# Patient Record
Sex: MALE | Race: BLACK OR AFRICAN AMERICAN | NOT HISPANIC OR LATINO | URBAN - METROPOLITAN AREA
[De-identification: names, ages, dates, MRNs, and addresses within clinical notes are randomized per-mention and may not be internally consistent; named-entity substitution may affect disease eponyms.]

---

## 2018-04-16 ENCOUNTER — INPATIENT (INPATIENT)
Facility: HOSPITAL | Age: 83
LOS: 3 days | Discharge: NURSING, REHAB OR LONG-TERM CARE | End: 2018-04-20
Attending: INTERNAL MEDICINE | Admitting: INTERNAL MEDICINE
Payer: MEDICARE

## 2018-04-16 VITALS
OXYGEN SATURATION: 98 % | SYSTOLIC BLOOD PRESSURE: 140 MMHG | RESPIRATION RATE: 20 BRPM | HEART RATE: 82 BPM | HEIGHT: 67 IN | TEMPERATURE: 98 F | DIASTOLIC BLOOD PRESSURE: 92 MMHG | WEIGHT: 190.04 LBS

## 2018-04-16 PROCEDURE — 99285 EMERGENCY DEPT VISIT HI MDM: CPT

## 2018-04-16 NOTE — ED ADULT TRIAGE NOTE - CHIEF COMPLAINT QUOTE
BIBA.  per EMS,. pt was going to sit in his wheelchair and "heard something snap".  pt c/o R-hip pain

## 2018-04-16 NOTE — ED PROVIDER NOTE - OBJECTIVE STATEMENT
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  Pt states he was going to sit in wheelchair when he "heard a crack" in R hip.  Pt fell into wheelchair, no trauma, no head injury, no LOC.  Denies dizziness, CP, SOB, abd pain, syncope.    No fever/chills, No photophobia/eye pain/changes in vision, No ear pain/sore throat/dysphagia, No chest pain/palpitations, no SOB/cough/wheeze/stridor, No abdominal pain, no rash, no changes in neurological status/function.

## 2018-04-16 NOTE — ED PROVIDER NOTE - PHYSICAL EXAMINATION
Gen: Alert, c/o pain  Head: NC, AT, EOMI, normal lids/conjunctiva  ENT: normal hearing, patent oropharynx, MMM  Neck: supple, no tenderness/meningismus, FROM  Pulm: Bilateral clear BS, normal resp effort, no wheeze/stridor/retractions  CV: RRR, no M/R/G, +dist pulses  Abd: soft, NT/ND, +BS, no guarding/rebound tenderness  Mskel: +pain w movement of RLE, sensation intact  Skin: no rash  Neuro: no sensory/motor deficits

## 2018-04-17 DIAGNOSIS — C79.9 SECONDARY MALIGNANT NEOPLASM OF UNSPECIFIED SITE: ICD-10-CM

## 2018-04-17 DIAGNOSIS — S32.301A UNSPECIFIED FRACTURE OF RIGHT ILIUM, INITIAL ENCOUNTER FOR CLOSED FRACTURE: ICD-10-CM

## 2018-04-17 DIAGNOSIS — I10 ESSENTIAL (PRIMARY) HYPERTENSION: ICD-10-CM

## 2018-04-17 DIAGNOSIS — E83.52 HYPERCALCEMIA: ICD-10-CM

## 2018-04-17 DIAGNOSIS — N17.9 ACUTE KIDNEY FAILURE, UNSPECIFIED: ICD-10-CM

## 2018-04-17 LAB
ALBUMIN SERPL ELPH-MCNC: 3.3 G/DL — SIGNIFICANT CHANGE UP (ref 3.3–5)
ALP SERPL-CCNC: 72 U/L — SIGNIFICANT CHANGE UP (ref 40–120)
ALT FLD-CCNC: 15 U/L — SIGNIFICANT CHANGE UP (ref 12–78)
ANION GAP SERPL CALC-SCNC: 4 MMOL/L — LOW (ref 5–17)
ANION GAP SERPL CALC-SCNC: 9 MMOL/L — SIGNIFICANT CHANGE UP (ref 5–17)
APTT BLD: 33.1 SEC — SIGNIFICANT CHANGE UP (ref 27.5–37.4)
AST SERPL-CCNC: 21 U/L — SIGNIFICANT CHANGE UP (ref 15–37)
BASOPHILS # BLD AUTO: 0.04 K/UL — SIGNIFICANT CHANGE UP (ref 0–0.2)
BASOPHILS # BLD AUTO: 0.04 K/UL — SIGNIFICANT CHANGE UP (ref 0–0.2)
BASOPHILS NFR BLD AUTO: 0.7 % — SIGNIFICANT CHANGE UP (ref 0–2)
BASOPHILS NFR BLD AUTO: 0.8 % — SIGNIFICANT CHANGE UP (ref 0–2)
BILIRUB SERPL-MCNC: 0.3 MG/DL — SIGNIFICANT CHANGE UP (ref 0.2–1.2)
BUN SERPL-MCNC: 17 MG/DL — SIGNIFICANT CHANGE UP (ref 7–23)
BUN SERPL-MCNC: 17 MG/DL — SIGNIFICANT CHANGE UP (ref 7–23)
CALCIUM SERPL-MCNC: 10.3 MG/DL — HIGH (ref 8.5–10.1)
CALCIUM SERPL-MCNC: 9.7 MG/DL — SIGNIFICANT CHANGE UP (ref 8.5–10.1)
CHLORIDE SERPL-SCNC: 101 MMOL/L — SIGNIFICANT CHANGE UP (ref 96–108)
CHLORIDE SERPL-SCNC: 106 MMOL/L — SIGNIFICANT CHANGE UP (ref 96–108)
CO2 SERPL-SCNC: 26 MMOL/L — SIGNIFICANT CHANGE UP (ref 22–31)
CO2 SERPL-SCNC: 27 MMOL/L — SIGNIFICANT CHANGE UP (ref 22–31)
CREAT SERPL-MCNC: 1.33 MG/DL — HIGH (ref 0.5–1.3)
CREAT SERPL-MCNC: 1.61 MG/DL — HIGH (ref 0.5–1.3)
EOSINOPHIL # BLD AUTO: 0.06 K/UL — SIGNIFICANT CHANGE UP (ref 0–0.5)
EOSINOPHIL # BLD AUTO: 0.06 K/UL — SIGNIFICANT CHANGE UP (ref 0–0.5)
EOSINOPHIL NFR BLD AUTO: 1.1 % — SIGNIFICANT CHANGE UP (ref 0–6)
EOSINOPHIL NFR BLD AUTO: 1.2 % — SIGNIFICANT CHANGE UP (ref 0–6)
GLUCOSE SERPL-MCNC: 111 MG/DL — HIGH (ref 70–99)
GLUCOSE SERPL-MCNC: 95 MG/DL — SIGNIFICANT CHANGE UP (ref 70–99)
HCT VFR BLD CALC: 26.9 % — LOW (ref 39–50)
HCT VFR BLD CALC: 27.4 % — LOW (ref 39–50)
HGB BLD-MCNC: 8.7 G/DL — LOW (ref 13–17)
HGB BLD-MCNC: 9.1 G/DL — LOW (ref 13–17)
IMM GRANULOCYTES NFR BLD AUTO: 0.2 % — SIGNIFICANT CHANGE UP (ref 0–1.5)
IMM GRANULOCYTES NFR BLD AUTO: 0.5 % — SIGNIFICANT CHANGE UP (ref 0–1.5)
INR BLD: 1.18 RATIO — HIGH (ref 0.88–1.16)
LYMPHOCYTES # BLD AUTO: 0.57 K/UL — LOW (ref 1–3.3)
LYMPHOCYTES # BLD AUTO: 0.74 K/UL — LOW (ref 1–3.3)
LYMPHOCYTES # BLD AUTO: 11.6 % — LOW (ref 13–44)
LYMPHOCYTES # BLD AUTO: 13.3 % — SIGNIFICANT CHANGE UP (ref 13–44)
MCHC RBC-ENTMCNC: 27.9 PG — SIGNIFICANT CHANGE UP (ref 27–34)
MCHC RBC-ENTMCNC: 28 PG — SIGNIFICANT CHANGE UP (ref 27–34)
MCHC RBC-ENTMCNC: 32.3 GM/DL — SIGNIFICANT CHANGE UP (ref 32–36)
MCHC RBC-ENTMCNC: 33.2 GM/DL — SIGNIFICANT CHANGE UP (ref 32–36)
MCV RBC AUTO: 84 FL — SIGNIFICANT CHANGE UP (ref 80–100)
MCV RBC AUTO: 86.5 FL — SIGNIFICANT CHANGE UP (ref 80–100)
MONOCYTES # BLD AUTO: 0.65 K/UL — SIGNIFICANT CHANGE UP (ref 0–0.9)
MONOCYTES # BLD AUTO: 0.79 K/UL — SIGNIFICANT CHANGE UP (ref 0–0.9)
MONOCYTES NFR BLD AUTO: 13.2 % — SIGNIFICANT CHANGE UP (ref 2–14)
MONOCYTES NFR BLD AUTO: 14.2 % — HIGH (ref 2–14)
NEUTROPHILS # BLD AUTO: 3.58 K/UL — SIGNIFICANT CHANGE UP (ref 1.8–7.4)
NEUTROPHILS # BLD AUTO: 3.91 K/UL — SIGNIFICANT CHANGE UP (ref 1.8–7.4)
NEUTROPHILS NFR BLD AUTO: 70.2 % — SIGNIFICANT CHANGE UP (ref 43–77)
NEUTROPHILS NFR BLD AUTO: 73 % — SIGNIFICANT CHANGE UP (ref 43–77)
NRBC # BLD: 0 /100 WBCS — SIGNIFICANT CHANGE UP (ref 0–0)
NRBC # BLD: 0 /100 WBCS — SIGNIFICANT CHANGE UP (ref 0–0)
PLATELET # BLD AUTO: 212 K/UL — SIGNIFICANT CHANGE UP (ref 150–400)
PLATELET # BLD AUTO: 235 K/UL — SIGNIFICANT CHANGE UP (ref 150–400)
POTASSIUM SERPL-MCNC: 3.9 MMOL/L — SIGNIFICANT CHANGE UP (ref 3.5–5.3)
POTASSIUM SERPL-MCNC: 4.3 MMOL/L — SIGNIFICANT CHANGE UP (ref 3.5–5.3)
POTASSIUM SERPL-SCNC: 3.9 MMOL/L — SIGNIFICANT CHANGE UP (ref 3.5–5.3)
POTASSIUM SERPL-SCNC: 4.3 MMOL/L — SIGNIFICANT CHANGE UP (ref 3.5–5.3)
PROT SERPL-MCNC: 9.7 GM/DL — HIGH (ref 6–8.3)
PROTHROM AB SERPL-ACNC: 12.9 SEC — HIGH (ref 9.8–12.7)
RBC # BLD: 3.11 M/UL — LOW (ref 4.2–5.8)
RBC # BLD: 3.26 M/UL — LOW (ref 4.2–5.8)
RBC # FLD: 17.9 % — HIGH (ref 10.3–14.5)
RBC # FLD: 18.1 % — HIGH (ref 10.3–14.5)
SODIUM SERPL-SCNC: 136 MMOL/L — SIGNIFICANT CHANGE UP (ref 135–145)
SODIUM SERPL-SCNC: 137 MMOL/L — SIGNIFICANT CHANGE UP (ref 135–145)
WBC # BLD: 4.91 K/UL — SIGNIFICANT CHANGE UP (ref 3.8–10.5)
WBC # BLD: 5.57 K/UL — SIGNIFICANT CHANGE UP (ref 3.8–10.5)
WBC # FLD AUTO: 4.91 K/UL — SIGNIFICANT CHANGE UP (ref 3.8–10.5)
WBC # FLD AUTO: 5.57 K/UL — SIGNIFICANT CHANGE UP (ref 3.8–10.5)

## 2018-04-17 PROCEDURE — 72192 CT PELVIS W/O DYE: CPT | Mod: 26

## 2018-04-17 PROCEDURE — 73552 X-RAY EXAM OF FEMUR 2/>: CPT | Mod: 26,RT

## 2018-04-17 PROCEDURE — 12345: CPT | Mod: NC

## 2018-04-17 PROCEDURE — 99223 1ST HOSP IP/OBS HIGH 75: CPT

## 2018-04-17 PROCEDURE — 73502 X-RAY EXAM HIP UNI 2-3 VIEWS: CPT | Mod: 26,RT

## 2018-04-17 RX ORDER — MORPHINE SULFATE 50 MG/1
2 CAPSULE, EXTENDED RELEASE ORAL EVERY 6 HOURS
Qty: 0 | Refills: 0 | Status: DISCONTINUED | OUTPATIENT
Start: 2018-04-17 | End: 2018-04-20

## 2018-04-17 RX ORDER — SODIUM CHLORIDE 9 MG/ML
1500 INJECTION INTRAMUSCULAR; INTRAVENOUS; SUBCUTANEOUS ONCE
Qty: 0 | Refills: 0 | Status: COMPLETED | OUTPATIENT
Start: 2018-04-17 | End: 2018-04-17

## 2018-04-17 RX ORDER — MORPHINE SULFATE 50 MG/1
2 CAPSULE, EXTENDED RELEASE ORAL ONCE
Qty: 0 | Refills: 0 | Status: DISCONTINUED | OUTPATIENT
Start: 2018-04-17 | End: 2018-04-17

## 2018-04-17 RX ORDER — HEPARIN SODIUM 5000 [USP'U]/ML
5000 INJECTION INTRAVENOUS; SUBCUTANEOUS EVERY 12 HOURS
Qty: 0 | Refills: 0 | Status: DISCONTINUED | OUTPATIENT
Start: 2018-04-17 | End: 2018-04-20

## 2018-04-17 RX ADMIN — MORPHINE SULFATE 2 MILLIGRAM(S): 50 CAPSULE, EXTENDED RELEASE ORAL at 02:23

## 2018-04-17 RX ADMIN — MORPHINE SULFATE 2 MILLIGRAM(S): 50 CAPSULE, EXTENDED RELEASE ORAL at 07:12

## 2018-04-17 RX ADMIN — SODIUM CHLORIDE 375 MILLILITER(S): 9 INJECTION INTRAMUSCULAR; INTRAVENOUS; SUBCUTANEOUS at 07:54

## 2018-04-17 RX ADMIN — MORPHINE SULFATE 2 MILLIGRAM(S): 50 CAPSULE, EXTENDED RELEASE ORAL at 03:00

## 2018-04-17 RX ADMIN — MORPHINE SULFATE 2 MILLIGRAM(S): 50 CAPSULE, EXTENDED RELEASE ORAL at 06:22

## 2018-04-17 NOTE — ED CLERICAL - NS ED CLERK UNITS
Problem: Chemotherapy Treatment  Goal: *Chemotherapy regimen followed  Outcome: Progressing Towards Goal  PT arrived at [facility] ambulatory and in no distress  For Gemzar C3D1 RF 1D/162W

## 2018-04-17 NOTE — H&P ADULT - NSHPREVIEWOFSYSTEMS_GEN_ALL_CORE
REVIEW OF SYSTEMS:  CONSTITUTIONAL: No fever, weight loss, or fatigue  EYES: No eye pain, visual disturbances, or discharge  ENMT:  No difficulty hearing, tinnitus, vertigo; No sinus or throat pain  NECK: No pain or stiffness  RESPIRATORY: No cough, wheezing, chills or hemoptysis; No shortness of breath  CARDIOVASCULAR: No chest pain, palpitations, dizziness, or leg swelling  GASTROINTESTINAL: No abdominal or epigastric pain. No nausea, vomiting, or hematemesis; No diarrhea or constipation. No melena or hematochezia.  GENITOURINARY: No dysuria, frequency, hematuria, or incontinence  NEUROLOGICAL: No headaches, memory loss, loss of strength, numbness, or tremors  SKIN: No itching, burning, rashes, or lesions   LYMPH NODES: No enlarged glands  ENDOCRINE: No heat or cold intolerance; No hair loss  MUSCULOSKELETAL: No joint pain or swelling; No muscle, back pain, + hip pain  PSYCHIATRIC: No depression, anxiety, mood swings, or difficulty sleeping  HEME/LYMPH: No easy bruising, or bleeding gums  ALLERGY AND IMMUNOLOGIC: No hives or eczema

## 2018-04-17 NOTE — H&P ADULT - HISTORY OF PRESENT ILLNESS
89 y/o Male PMH of HTN, melanoma with mets to bone s/p chemo & radiation, complains of right pelvic pain for 1 day. Patient states that he was transferring to his wheelchair and felt a pop in his right pelvic region, with right pelvic pain since then and difficulty ambulating. Patient denies numbness, paresthesias, head strike, loss of consciousness, bowel or bladder changes, numbness, tingling, paresthesias, saddle anesthesia or any other signs/symptoms at this time. Patient states that he was previously a household ambulator with a walker at baseline, but has rapidly declined in ambulation status within the past month, requiring a wheelchair within the past week. Patient complains of diffuse pains, but denies back pain or left hip pain.

## 2018-04-17 NOTE — ED ADULT NURSE NOTE - OBJECTIVE STATEMENT
Patient complaining of right hip pain after a fall at home, pt was going to sit on his wheelchair , felt and heard a snap . pain level 10/10

## 2018-04-17 NOTE — PATIENT PROFILE ADULT. - TEACHING/LEARNING LEARNING PREFERENCES
audio/skill demonstration/individual instruction/computer/internet/written material/verbal instruction

## 2018-04-17 NOTE — CONSULT NOTE ADULT - SUBJECTIVE AND OBJECTIVE BOX
HPI  89M complains of right pelvic pain for 1 day. Patient states that he was transferring to his wheelchair and felt a pop in his right pelvic region, with right pelvic pain since then and difficulty ambulating. Patient denies numbness, paresthesias, headstrike, loss of consciousness, bowel or bladder changes, numbness, tingling, paresthesias, saddle anesthesia or any other signs/symptoms at this time. Patient states that he was previously a household ambulator with a walker at baseline, but has rapidly declined in ambulation status within the past month, requiring a wheelchair within the past week. Patient complains of diffuse pains, but denies back pain or left hip pain.    Allergies: NKDA  PMH/PSH: HTN, multiple myeloma s/p chemo/radiation for 8 months, right femur IMN for prophylactic fixation ([facility] January 2018)  Social: Denies tobacco use  FH: Noncontributory  Imaging: XR right femur / pelvis, CT pelvis - right iliac wing fracture, multiple lytic lesions throughout    ROS: Negative for all systems except as noted above in HPI    Physical exam  VS: Afebrile, vital signs stable  Gen: NAD  right LE: Skin intact. No erythema/ecchymosis/warmth. No TTP bony prominences at knee/ankle/foot.+EHL/FHL/TA/GS. SILT L3-S1, +DP pulse, capillary refill brisk. Compartments soft and nontender.  Secondary survey: No TTP bony prominences with intact AROM at baseline per patient, but patient reports diffuse "spasm" pains. SILT. Capillary refill brisk. Able to SLR with contralateral leg. No TTP axial spine.      89M with right pathologic iliac wing fracture, s/p chemo/radiation/right femur IMN for multiple myeloma    WBAT  Pain control  No planned orthopedic intervention at this time  Medical management for multiple myeloma  Ortho stable for discharge  Follow up in office within 5 days of discharge with primary surgeon  Will discuss with attending and advise if change

## 2018-04-17 NOTE — H&P ADULT - NSHPLABSRESULTS_GEN_ALL_CORE
LABS:                        9.1    5.57  )-----------( 235      ( 17 Apr 2018 03:00 )             27.4     04-17    136  |  101  |  17  ----------------------------<  111<H>  4.3   |  26  |  1.61<H>    Ca    10.3<H>      17 Apr 2018 03:00    TPro  9.7<H>  /  Alb  3.3  /  TBili  0.3  /  DBili  x   /  AST  21  /  ALT  15  /  AlkPhos  72  04-17    PT/INR - ( 17 Apr 2018 03:00 )   PT: 12.9 sec;   INR: 1.18 ratio         PTT - ( 17 Apr 2018 03:00 )  PTT:33.1 sec    CAPILLARY BLOOD GLUCOSE              RADIOLOGY & ADDITIONAL TESTS:    Imaging Personally Reviewed:  [ x] YES  [ ] NO  < from: CT Pelvis Bony Only No Cont (04.17.18 @ 03:23) >    Acute minimally displaced pathologic fracture of the right iliac wing   secondary to large destructive lytic lesion as described above.   Additional lytic lesion in the ischium and sacrum. Findings are likely   secondary to metastatic disease. Myeloma is also a consideration.    CXR: no infiltrate, uncoiled aorta      Consultant(s) Notes Reviewed:  [x ] YES  [ ] NO

## 2018-04-17 NOTE — H&P ADULT - ASSESSMENT
89 y/o man with melanoma metastatic to bone, presents with pathological fracture. Pt seen and evaluated by orthopedics, not surgical candidate.  IMPROVE VTE Individual Risk Assessment          RISK                                                          Points    [  ] Previous VTE                                                3    [  ] Thrombophilia                                             2    [  ] Lower limb paralysis                                    2        (unable to hold up >15 seconds)      [x  ] Current Cancer                                             2         (within 6 months)    [  ] Immobilization > 24 hrs                              1    [  ] ICU/CCU stay > 24 hours                            1    [ x ] Age > 60                                                    1    IMPROVE VTE Score ____3_____

## 2018-04-17 NOTE — CHART NOTE - NSCHARTNOTEFT_GEN_A_CORE
89 y/o Male PMH of HTN, melanoma with mets to bone s/p chemo & radiation, complains of right pelvic pain for 1 day      CT pelvis : Acute minimally displaced pathologic fracture of the right iliac wing   secondary to large destructive lytic lesion as described above.   Additional lytic lesion in the ischium and sacrum.    As per ortho team ,No planned orthopedic intervention at this time  PT/OT consult,  pain control , discussed w/ pt , and daughter Tiffanie over the phone as per pt's request

## 2018-04-17 NOTE — H&P ADULT - NSHPPHYSICALEXAM_GEN_ALL_CORE
T(C): 36.7 (17 Apr 2018 04:41), Max: 36.7 (17 Apr 2018 04:41)  T(F): 98 (17 Apr 2018 04:41), Max: 98 (17 Apr 2018 04:41)  HR: 80 (17 Apr 2018 04:41) (80 - 82)  BP: 138/90 (17 Apr 2018 04:41) (138/90 - 140/92)  BP(mean): --  RR: 20 (17 Apr 2018 04:41) (20 - 20)  SpO2: 98% (17 Apr 2018 04:41) (98% - 98%)    PHYSICAL EXAM:  GENERAL: NAD, well-groomed, well-developed  HEAD:  Atraumatic, Normocephalic  EYES: EOMI, PERRLA, conjunctiva and sclera clear  ENMT: No tonsillar erythema, exudates, or enlargement; Moist mucous membranes, Good dentition, No lesions  NECK: Supple, No JVD, Normal thyroid  NERVOUS SYSTEM:  Alert & Oriented X3, Good concentration; Motor Strength 5/5 B/L upper and lower extremities; DTRs 2+ intact and symmetric  CHEST/LUNG: Clear to percussion bilaterally; No rales, rhonchi, wheezing, or rubs  HEART: Regular rate and rhythm; No murmurs, rubs, or gallops  ABDOMEN: Soft, Nontender, Nondistended; Bowel sounds present  EXTREMITIES: + Peripheral Pulses, No clubbing, cyanosis, or edema, tender over R hip  LYMPH: No lymphadenopathy noted  SKIN: No rashes or lesions

## 2018-04-18 DIAGNOSIS — D63.8 ANEMIA IN OTHER CHRONIC DISEASES CLASSIFIED ELSEWHERE: ICD-10-CM

## 2018-04-18 LAB
ANION GAP SERPL CALC-SCNC: 7 MMOL/L — SIGNIFICANT CHANGE UP (ref 5–17)
BUN SERPL-MCNC: 14 MG/DL — SIGNIFICANT CHANGE UP (ref 7–23)
CALCIUM SERPL-MCNC: 9.8 MG/DL — SIGNIFICANT CHANGE UP (ref 8.5–10.1)
CHLORIDE SERPL-SCNC: 105 MMOL/L — SIGNIFICANT CHANGE UP (ref 96–108)
CO2 SERPL-SCNC: 24 MMOL/L — SIGNIFICANT CHANGE UP (ref 22–31)
CREAT SERPL-MCNC: 1.25 MG/DL — SIGNIFICANT CHANGE UP (ref 0.5–1.3)
GLUCOSE SERPL-MCNC: 92 MG/DL — SIGNIFICANT CHANGE UP (ref 70–99)
HCT VFR BLD CALC: 27.6 % — LOW (ref 39–50)
HGB BLD-MCNC: 8.8 G/DL — LOW (ref 13–17)
MCHC RBC-ENTMCNC: 27.2 PG — SIGNIFICANT CHANGE UP (ref 27–34)
MCHC RBC-ENTMCNC: 31.9 GM/DL — LOW (ref 32–36)
MCV RBC AUTO: 85.4 FL — SIGNIFICANT CHANGE UP (ref 80–100)
NRBC # BLD: 0 /100 WBCS — SIGNIFICANT CHANGE UP (ref 0–0)
PLATELET # BLD AUTO: 202 K/UL — SIGNIFICANT CHANGE UP (ref 150–400)
POTASSIUM SERPL-MCNC: 4.2 MMOL/L — SIGNIFICANT CHANGE UP (ref 3.5–5.3)
POTASSIUM SERPL-SCNC: 4.2 MMOL/L — SIGNIFICANT CHANGE UP (ref 3.5–5.3)
RBC # BLD: 3.23 M/UL — LOW (ref 4.2–5.8)
RBC # FLD: 18.2 % — HIGH (ref 10.3–14.5)
SODIUM SERPL-SCNC: 136 MMOL/L — SIGNIFICANT CHANGE UP (ref 135–145)
WBC # BLD: 5.09 K/UL — SIGNIFICANT CHANGE UP (ref 3.8–10.5)
WBC # FLD AUTO: 5.09 K/UL — SIGNIFICANT CHANGE UP (ref 3.8–10.5)

## 2018-04-18 PROCEDURE — 99232 SBSQ HOSP IP/OBS MODERATE 35: CPT

## 2018-04-18 PROCEDURE — 99497 ADVNCD CARE PLAN 30 MIN: CPT

## 2018-04-18 RX ORDER — OXYCODONE AND ACETAMINOPHEN 5; 325 MG/1; MG/1
1 TABLET ORAL EVERY 4 HOURS
Qty: 0 | Refills: 0 | Status: DISCONTINUED | OUTPATIENT
Start: 2018-04-18 | End: 2018-04-20

## 2018-04-18 RX ADMIN — MORPHINE SULFATE 2 MILLIGRAM(S): 50 CAPSULE, EXTENDED RELEASE ORAL at 11:38

## 2018-04-18 RX ADMIN — HEPARIN SODIUM 5000 UNIT(S): 5000 INJECTION INTRAVENOUS; SUBCUTANEOUS at 06:21

## 2018-04-18 RX ADMIN — HEPARIN SODIUM 5000 UNIT(S): 5000 INJECTION INTRAVENOUS; SUBCUTANEOUS at 18:54

## 2018-04-18 NOTE — PROGRESS NOTE ADULT - SUBJECTIVE AND OBJECTIVE BOX
CHIEF COMPLAINT/INTERVAL HISTORY:    Patient is a 89y old  Male who presents with a chief complaint of Hip pain. (17 Apr 2018 07:06)      HPI:  89 y/o Male PMH of HTN, melanoma with mets to bone s/p chemo & radiation, complains of right pelvic pain for 1 day. Patient states that he was transferring to his wheelchair and felt a pop in his right pelvic region, with right pelvic pain since then and difficulty ambulating. Patient denies numbness, paresthesias, head strike, loss of consciousness, bowel or bladder changes, numbness, tingling, paresthesias, saddle anesthesia or any other signs/symptoms at this time. Patient states that he was previously a household ambulator with a walker at baseline, but has rapidly declined in ambulation status within the past month, requiring a wheelchair within the past week. Patient complains of diffuse pains, but denies back pain or left hip pain. (17 Apr 2018 07:06)      SUBJECTIVE & OBJECTIVE: Pt seen and examined at bedside.   c/o pain in pelvic area.  denies urinary or fecal incontinence.  Denies chest pain/SOB, nausea/vomiting/diarrhea, No cough, dizziness, HA or blurry vision, all other ROS negative.  Pt follows with Oncologist at [facility] for MM, last follow up 1.5 month ago, was supposed to follow again 2 days ago but missed appt.      Vital Signs Last 24 Hrs  T(C): 36.8 (18 Apr 2018 11:05), Max: 36.8 (18 Apr 2018 11:05)  T(F): 98.3 (18 Apr 2018 11:05), Max: 98.3 (18 Apr 2018 11:05)  HR: 76 (18 Apr 2018 11:05) (76 - 83)  BP: 110/64 (18 Apr 2018 11:05) (105/67 - 126/68)  BP(mean): --  ABP: --  ABP(mean): --  RR: 17 (18 Apr 2018 11:05) (16 - 18)  SpO2: 97% (18 Apr 2018 11:05) (95% - 99%)      MEDICATIONS  (STANDING):  heparin  Injectable 5000 Unit(s) SubCutaneous every 12 hours    MEDICATIONS  (PRN):  morphine  - Injectable 2 milliGRAM(s) IV Push every 6 hours PRN Severe Pain (7 - 10)      PHYSICAL EXAM:    GENERAL: NAD, elderly  HEAD:  Atraumatic, Normocephalic  EYES: EOMI, PERRLA, conjunctiva and sclera clear  ENMT: Moist mucous membranes  NECK: Supple, No JVD  NERVOUS SYSTEM:  Alert & Oriented X3, Motor Strength 4/5 B/L upper and lower extremities;   CHEST/LUNG: Clear to auscultation bilaterally; No rales, rhonchi, wheezing, or rubs  HEART: Regular rate and rhythm;   ABDOMEN: Soft, Nontender, Nondistended; Bowel sounds present  EXTREMITIES:   No clubbing, cyanosis, or edema    LABS:                        8.8    5.09  )-----------( 202      ( 18 Apr 2018 06:29 )             27.6     04-18    136  |  105  |  14  ----------------------------<  92  4.2   |  24  |  1.25    Ca    9.8      18 Apr 2018 06:29    TPro  9.7<H>  /  Alb  3.3  /  TBili  0.3  /  DBili  x   /  AST  21  /  ALT  15  /  AlkPhos  72  04-17    PT/INR - ( 17 Apr 2018 03:00 )   PT: 12.9 sec;   INR: 1.18 ratio         PTT - ( 17 Apr 2018 03:00 )  PTT:33.1 sec    < from: CT Pelvis Bony Only No Cont (04.17.18 @ 03:23) >  IMPRESSION:    Acute minimally displaced pathologic fracture of the right iliac wing   secondary to large destructive lytic lesion as described above.   Additional lytic lesion in the ischium and sacrum. Findings are likely   secondary to metastatic disease. Myeloma is also a consideration.      < end of copied text >

## 2018-04-18 NOTE — GOALS OF CARE CONVERSATION - PERSONAL ADVANCE DIRECTIVE - CONVERSATION DETAILS
89 y/o Male PMH of HTN, melanoma with mets to bone s/p chemo & radiation, complains of right pelvic pain for 1 day. Patient states that he was transferring to his wheelchair and felt a pop in his right pelvic region, with right pelvic pain since then and difficulty ambulating. Patient denies numbness, paresthesias, head strike, loss of consciousness, bowel or bladder changes, numbness, tingling, paresthesias, saddle anesthesia or any other signs/symptoms at this time. Patient states that he was previously a household ambulator with a walker at baseline, but has rapidly declined in ambulation status within the past month, requiring a wheelchair within the past week. Patient complains of diffuse pains, but denies back pain or left hip pain.     DATE: 4/18/18.Met and  spoke to patient and discussed goals of care and advance care planning.                                          Educated on MOLST Form and completed, DNR/DNI, Comfort measures only.                                          Physical therapy evaluation done.  PLAN: Send patient to short term rehab when medically stable.

## 2018-04-18 NOTE — GOALS OF CARE CONVERSATION - PERSONAL ADVANCE DIRECTIVE - TREATMENT GUIDELINE COMMENT
DATE: 4/18/18.Met and  spoke to patient and discussed goals of care and advance care planning.                                          Educated on MOLST Form and completed, DNR/DNI, Comfort measures only.                                          Physical therapy evaluation done.  PLAN: Send patient to short term rehab when medically stable.

## 2018-04-18 NOTE — PROGRESS NOTE ADULT - SUBJECTIVE AND OBJECTIVE BOX
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 Patient states that he was transferring to his wheelchair and felt a pop in his right pelvic region, with right pelvic pain since then and difficulty ambulating. Patient denies numbness, paresthesias, head strike, loss of consciousness, bowel or bladder changes, numbness, tingling, paresthesias, saddle anesthesia or any other signs/symptoms at this time. Patient states that he was previously a household ambulator with a walker at baseline, but has rapidly declined in ambulation status within the past month, requiring a wheelchair within the past week. Patient complains of diffuse pains, but denies back pain or left hip pain. (17 Apr 2018 07:06)      PAST MEDICAL & SURGICAL HISTORY:  Essential hypertension  Melanoma  No significant past surgical history      SOCIAL HISTORY: Admitted from: Home [ ] SNF [ ] [facility] [ ] [facility] [ ]                                                                smoker [ ] past smoker [ ] non smoker[ ]                                                              no alcohol [ ] social alcohol [ ] alcohol [ ]  Surrogate/ HCP/ Guardian: [ ] YES [ ] NO.     NAME / PHONE #:    Significant other/partner:                     Children:                         Pentecostal/Spirituality:    FAMILY HISTORY:  No pertinent family history in first degree relatives      Baseline ADLs (Prior to admission)  Independent:  Moderately [ ] fully [ ]   Dependent: moderately [ ] fully [ ]    ADVANCE DIRECTIVES:  [ ] YES [ ] NO   DNR: YES [ ] NO [  ]  Completed on:                     MOLST: YES [ ] NO [  ]  Completed on:  Living Will: YES [ ] NO [  ]  Completed on:    Allergies    No Known Allergies    Intolerances      MEDICATIONS  (STANDING):  heparin  Injectable 5000 Unit(s) SubCutaneous every 12 hours    MEDICATIONS  (PRN):  morphine  - Injectable 2 milliGRAM(s) IV Push every 6 hours PRN Severe Pain (7 - 10)  oxyCODONE    5 mG/acetaminophen 325 mG 1 Tablet(s) Oral every 4 hours PRN Moderate Pain (4 - 6)    OPIATE NAIVE: (Y/N)  I STOP REVIEWED: (Y/N) : (#-------------------)     REVIEW OF SYSTEM:     PAIN : (Y/N) (0-10)  PAIN SITE :                           QUALITY/QUANTITY OF PAIN :             PAIN RADIATING :( Y/N)            SEVERITY :            FREQUENCY :  IMPACT ON ADLs :      DYSPNEA: Yes [  ] No [  ] Mild [ ] Moderate [ ] Severe [ ]  NAUSEA/VOMITING: Yes [  ] No [  ]  EXCESSIVE SECRETIONS: YES [  ] NO [  ]  DEPRESSION: Yes [  ] No [  ] Mild [ ]Moderate [ ] Severe [ ]  ANXIETY: Yes [  ] No [  ]  AGITATION: Yes [  ] No [  ]  CONSTIPATION : Yes [  ] No [  ]  DIARRHEA : Yes [  ] No [  ]  FRAILTY SYNDROME: Yes [  ] No [  ]  FAILURE TO THRIVE: Yes [  ] No [  ]  DIBILITY: Yes [  ] No [  ]  OTHER SYMPTOMS :  UNABLE TO OBTAIN DUE TO POOR MENTATION [  ]    PHYSICAL EXAM:  T(F): 98.7 (04-18-18 @ 17:25), Max: 98.7 (04-18-18 @ 17:25)  HR: 75 (04-18-18 @ 17:25) (75 - 82)  BP: 114/73 (04-18-18 @ 17:25) (105/67 - 126/68)  RR: 17 (04-18-18 @ 17:25) (17 - 18)  SpO2: 95% (04-18-18 @ 17:25) (95% - 98%)  Wt(kg): --   WEIGHT :                      BMI:  I&O's Summary    CAPILLARY BLOOD GLUCOSE          GENERAL: alert: Yes [ ] No [  ]oriented x ______confused [ ] lethargic [ ] agitated [ ] cachexia [ ] nonverbal [ ] coma [ ]  HEENT: normal [ ] dry mouth [ ] excessive secretions [ ] BiPAP [  ] ET tube/trach [ ] Vent [  ] O2 via N/C [ ]  LUNGS: Comfortable [ ] tachypnea/ labored breathing[ ]   CV :  normal [ ] tachycardia [ ]bradycardia [  ]   GI : normal [ ] distended [ ] tender [ ] no BS [ ]  PEG /NG tube (feeding/ Suction) [ ]   : normal [ ] incontinent [ ] oliguria/anuria [ ] Patel [ ]  MSK : normal [ ] weakness [ ] edema [ ] ambulatory [ ] bedbound/ wheelchair bound [ ]   SKIN : normal [ ] pressure ulcer: Yes [  ] No [  ] Stage ______________ rash: Yes [  ] No [  ]    FUNCTIONAL ASSESSMENT:   Karnofsky Performance Score :  Palliative Performance Status Version 2:         %    LABS:                        8.8    5.09  )-----------( 202      ( 18 Apr 2018 06:29 )             27.6     04-18    136  |  105  |  14  ----------------------------<  92  4.2   |  24  |  1.25    Ca    9.8      18 Apr 2018 06:29    TPro  9.7<H>  /  Alb  3.3  /  TBili  0.3  /  DBili  x   /  AST  21  /  ALT  15  /  AlkPhos  72  04-17    PT/INR - ( 17 Apr 2018 03:00 )   PT: 12.9 sec;   INR: 1.18 ratio         PTT - ( 17 Apr 2018 03:00 )  PTT:33.1 sec      I&O's Detail      ASSESSMENT:   89y Male admitted with FRACTURE OF ILIAC WING PATHOLOGICAL FRACTURE OF ILIUM  WEAKNESS      PROBLEM LIST :  PROBLEM/RECOMMENDATION:   1) PROBLEM  : Goals of care, counseling/ discussion.  RECOMMENDATION : Meet with patient/ family and discussed GOC & Advanced care planning.                                            Palliative care information /counseling provided.                                             Advanced Directives addressed     PROBLEM/ RECOMMENDATION:  2)PROBLEM :CODE STATUS (Resuscitation/ DNR/ DNI),   RECOMMENDATION: DNR/ DNI    PROBLEM/ RECOMMENDATION :  3)PROBLEM : Medical order for life sustaining treatment  RECOMMENDATION : MOLST Form ( initiated/ completed) on :    PROBLEM/RECOMMENDATION :  4)PROBLEM: Advanced care planning  RECOMMENDATION : Family meeting on: DATE:                                          Met and  spoke to patient and discussed goals of care and advance care planning.                                          Educated on MOLST Form and completed, DNR/DNI, Comfort measures only.                                          Physical therapy evaluation done.                                         PLAN: Send patient to short term rehab when medically stable.    RECOMMENDATIONS:      CONSIDER DNR/ DNI, COMFORT CARE.                                          PAIN MANAGEMENT.                                          SYMPTOM MANAGEMENT WITH HOSPICE CARE.                                          HOSPICE CARE.        REFFERALS MADE FOR :                                                PALLIATIVE /MEDICAL SOCIAL WORKER AND : YES [  ] NO [  ]                                            HOSPICE: YES [  ] NO [  ]                                            : YES [  ] NO [  ]                                            NUTRITION: YES [  ] NO [  ]                                            SPEECH/ SWALLOW: YES [  ] NO [  ]                                            PSYHCH CONSULT: YES [ ] NO [ ]                                             PAIN MANAGEMENT: YES [  ] NO [  ]                                            PT/OT: YES [  ] NO [  ]                                            ETHICS: YES [  ] NO [  ] PALLIATIVE CARE CONSULTATION NOTE            Requested by Name: Dr. Gray  Date/ Time: 4/181/8  Reason for referral/ Consultation: Goals of care conversation.  HPI:  89 y/o Male PMH of HTN, melanoma with mets to bone s/p chemo & radiation, complains of right pelvic pain for 1 day. Patient states that he was transferring to his wheelchair and felt a pop in his right pelvic region, with right pelvic pain since then and difficulty ambulating. Patient denies numbness, paresthesias, head strike, loss of consciousness, bowel or bladder changes, numbness, tingling, paresthesias, saddle anesthesia or any other signs/symptoms at this time. Patient states that he was previously a household ambulator with a walker at baseline, but has rapidly declined in ambulation status within the past month, requiring a wheelchair within the past week. Patient complains of diffuse pains, but denies back pain or left hip pain.   PAST MEDICAL & SURGICAL HISTORY:  Essential hypertension  Melanoma  No significant past surgical history  SOCIAL HISTORY: Admitted from: Home [x ] SNF [ ] [facility] [ ] AL [ ]                                                                smoker [ ] past smoker [ ] non smoker[ ]                                                              no alcohol [ ] social alcohol [ ] alcohol [ ]  Surrogate/ HCP/ Guardian: [ ] YES [ ] NO.     NAME / PHONE #: Tiffanie Hendrickson ( daughter) [phone]    Significant other/partner:                     Children:                         Catholic/Spirituality:    FAMILY HISTORY:  No pertinent family history in first degree relatives    Baseline ADLs (Prior to admission)  Independent:  Moderately [x ] fully [ ]   Dependent: moderately [ ] fully [ ]    ADVANCE DIRECTIVES:  [x ] YES [ ] NO   DNR: YES [x ] NO [  ]  Completed on:                     MOLST: YES [x ] NO [  ]  Completed on: 4/18/18  Living Will: YES [ ] NO [  ]  Completed on:    Allergies  No Known Allergies  Intolerances    MEDICATIONS  (STANDING):  heparin  Injectable 5000 Unit(s) SubCutaneous every 12 hours    MEDICATIONS  (PRN):  morphine  - Injectable 2 milliGRAM(s) IV Push every 6 hours PRN Severe Pain (7 - 10)  oxyCODONE    5 mG/acetaminophen 325 mG 1 Tablet(s) Oral every 4 hours PRN Moderate Pain (4 - 6)    OPIATE NAIVE: (Y/N)  I STOP REVIEWED: (Y/N) : (#-------------------)     REVIEW OF SYSTEM:     PAIN : (Y/N) (0-10)  PAIN SITE :    right hip   QUALITY/QUANTITY OF PAIN :  sharp           PAIN RADIATING : Y            SEVERITY :  8/10   FREQUENCY : movement  IMPACT ON ADLs : total care     DYSPNEA: Yes [ x ] No [  ] Mild [ ] Moderate [x ] Severe [ ]  NAUSEA/VOMITING: Yes [  ] No [ x ]  EXCESSIVE SECRETIONS: YES [  ] NO [ x ]  DEPRESSION: Yes [x  ] No [  ] Mild [ ]Moderate [x ] Severe [ ]  ANXIETY: Yes [ x ] No [  ]  AGITATION: Yes [  ] No [ x ]  CONSTIPATION : Yes [  ] No [  x]  DIARRHEA : Yes [  ] No [ x ]  FRAILTY SYNDROME: Yes [x  ] No [  ]  FAILURE TO THRIVE: Yes [ x ] No [  ]  DEBILITY Yes [x  ] No [  ]  OTHER SYMPTOMS :  UNABLE TO OBTAIN DUE TO POOR MENTATION [  ]    PHYSICAL EXAM:  T(F): 98.7 (04-18-18 @ 17:25), Max: 98.7 (04-18-18 @ 17:25)  HR: 75 (04-18-18 @ 17:25) (75 - 82)  BP: 114/73 (04-18-18 @ 17:25) (105/67 - 126/68)  RR: 17 (04-18-18 @ 17:25) (17 - 18)  SpO2: 95% (04-18-18 @ 17:25) (95% - 98%)  Wt(kg): --   WEIGHT :                      BMI:  I&O's Summary    CAPILLARY BLOOD GLUCOSE  GENERAL: alert: Yes [x ] No [  ]oriented x _3_____confused [ ] lethargic [ ] agitated [ ] cachexia [ ] nonverbal [ ] coma [ ]  HEENT: normal [x ] dry mouth [ ] excessive secretions [ ] BiPAP [  ] ET tube/trach [ ] Vent [  ] O2 via N/C [ ]  LUNGS: Comfortable [x ] tachypnea/ labored breathing[ ]   CV :  normal [ ] tachycardia [x ]bradycardia [  ]   GI : normal [x ] distended [ ] tender [ ] no BS [ ]  PEG /NG tube (feeding/ Suction) [ ]   : normal [ ] incontinent [x ] oliguria/anuria [ ] Patel [ ]  MSK : normal [ ] weakness [x ] edema [ ] ambulatory [ ] bedbound/ wheelchair bound [x ]   SKIN : normal [x ] pressure ulcer: Yes [  ] No [  ] Stage ______________ rash: Yes [  ] No [  ]    FUNCTIONAL ASSESSMENT:   Karnofsky Performance Score : <30  Palliative Performance Status Version 2:         %    LABS:                        8.8    5.09  )-----------( 202      ( 18 Apr 2018 06:29 )             27.6     04-18    136  |  105  |  14  ----------------------------<  92  4.2   |  24  |  1.25    Ca    9.8      18 Apr 2018 06:29    TPro  9.7<H>  /  Alb  3.3  /  TBili  0.3  /  DBili  x   /  AST  21  /  ALT  15  /  AlkPhos  72  04-17    PT/INR - ( 17 Apr 2018 03:00 )   PT: 12.9 sec;   INR: 1.18 ratio       PTT - ( 17 Apr 2018 03:00 )  PTT:33.1 sec    I&O's Detail  ASSESSMENT:   89y Male admitted with FRACTURE OF ILIAC WING PATHOLOGICAL FRACTURE OF ILIUM  WEAKNESS    PROBLEM LIST :  PROBLEM/RECOMMENDATION:   1) PROBLEM  : Goals of care, counseling/ discussion.  RECOMMENDATION : Meet with patient/ family and discussed GOC & Advanced care planning.                                            Palliative care information /counseling provided.                                             Advanced Directives addressed     PROBLEM/ RECOMMENDATION:  2)PROBLEM :CODE STATUS (Resuscitation/ DNR/ DNI),   RECOMMENDATION: DNR/ DNI    PROBLEM/ RECOMMENDATION :  3)PROBLEM : Medical order for life sustaining treatment  RECOMMENDATION : MOLST Form ( initiated/ completed) on :    PROBLEM/RECOMMENDATION :  4)PROBLEM: Advanced care planning  RECOMMENDATION : Family meeting on: DATE: 4/18/18.Met and  spoke to patient and discussed goals of care and advance care planning.                                          Educated on MOLST Form and completed, DNR/DNI, Comfort measures only.                                          Physical therapy evaluation done.  PLAN: Send patient to short term rehab when medically stable.    RECOMMENDATIONS:      CONSIDER DNR/ DNI, COMFORT CARE.                                          PAIN MANAGEMENT.                                          SYMPTOM MANAGEMENT WITH HOSPICE CARE.                                          HOSPICE CARE.    REFERRALS MADE FOR :       PALLIATIVE /MEDICAL SOCIAL WORKER AND : YES [x  ] NO [  ]                                            HOSPICE: YES [  ] NO [ x ]                                            : YES [  ] NO [ x ]                                            NUTRITION: YES [  ] NO [ x ]                                            SPEECH/ SWALLOW: YES [  ] NO [ x ]                                            PSYHCH CONSULT: YES [ ] NO [x ]                                             PAIN MANAGEMENT: YES [x  ] NO [  ]                                            PT/OT: YES [ x ] NO [  ]                                            ETHICS: YES [  ] NO [  ]

## 2018-04-18 NOTE — PHYSICAL THERAPY INITIAL EVALUATION ADULT - DIAGNOSIS, PT EVAL
Tomas Grye MRN# [id number]   YOB: 1946 Age: 71 year old     Date of Admission:  ***  Date of Discharge:  1/13/2018  Admitting Physician:  Elidia Chase MD  Discharging Physician: Elidia Mckeon,* (Contact: [phone])  Discharging Service:  Hematology / Oncology  Hospitalization Status: Inpatient     Primary Care Clinic:  ***  Primary Care Provider: Ekaterina Lozano     {   Salutation            :[id number]}            You have been identified as the Primary Care Provider for Tomas Grey, who was recently admitted to the [facility].  Thank you for the referral to our hospital.  It is our goal to provide the highest quality of care for our patients, including planning for seamless continuity of care by providing you with timely, accurate and concise information.  After reviewing the following combined discharge summary and final progress note, please contact us if you have any remaining questions.  The Discharging Physician will be the best informed, with their contact information listed above.  If unable to reach them, or if you have received this letter in error, please call [phone] and someone will try to help you.     
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]  Type 2 diabetes mellitus with stage 3 chronic kidney disease, unspecified long term insulin use status (H) [E11.22, N18.3]  Admit Date/Time: 1/8/2018  9:38 AM  Discharge Date: 1.13.18  Payor Source: [facility] / Product Type: HMO /     Readmission Assessment Measure (SERVANDO) Risk Score/category: elevated    Plan of Care Goals/Milestone Events:   Patient Goals:   Short-term - He would like to move to another SNF         Reason for Communication Hand-off Referral: Other required    Discharge Plan:  Return to [facility]     Concern for non-adherence with plan of care:   Y/N n  Discharge Needs Assessment:  He will need assistance in finding another SNF if he continues to wish to pursue this.     Already enrolled in Tele-monitoring program and name of program:  unknown  Follow-up specialty is recommended: see d/c orders     Follow-up plan:  Future Appointments  Date Time Provider Department Center   1/15/2018 7:00 PM Mariah Person, OT [facility]       Any outstanding tests or procedures:              Key Recommendations:      Anne Garcia    AVS/Discharge Summary is the source of truth; this is a helpful guide for improved communication of patient story          
R26.2 Difficulty in walking

## 2018-04-18 NOTE — PHYSICAL THERAPY INITIAL EVALUATION ADULT - PLANNED THERAPY INTERVENTIONS, PT EVAL
ROM/transfer training/gait training/postural re-education/balance training/strengthening/bed mobility training

## 2018-04-18 NOTE — PROGRESS NOTE ADULT - ASSESSMENT
89 y/o man with h/o melanoma metastatic to bone, presents with right pelvic bone pathological fracture. Pt seen and evaluated by orthopedics, not surgical candidate.

## 2018-04-19 DIAGNOSIS — C90.00 MULTIPLE MYELOMA NOT HAVING ACHIEVED REMISSION: ICD-10-CM

## 2018-04-19 DIAGNOSIS — M84.454A PATHOLOGICAL FRACTURE, PELVIS, INITIAL ENCOUNTER FOR FRACTURE: ICD-10-CM

## 2018-04-19 LAB
ANION GAP SERPL CALC-SCNC: 4 MMOL/L — LOW (ref 5–17)
BUN SERPL-MCNC: 14 MG/DL — SIGNIFICANT CHANGE UP (ref 7–23)
CALCIUM SERPL-MCNC: 9.7 MG/DL — SIGNIFICANT CHANGE UP (ref 8.5–10.1)
CHLORIDE SERPL-SCNC: 103 MMOL/L — SIGNIFICANT CHANGE UP (ref 96–108)
CO2 SERPL-SCNC: 29 MMOL/L — SIGNIFICANT CHANGE UP (ref 22–31)
CREAT SERPL-MCNC: 1.26 MG/DL — SIGNIFICANT CHANGE UP (ref 0.5–1.3)
FERRITIN SERPL-MCNC: 206 NG/ML — SIGNIFICANT CHANGE UP (ref 30–400)
GLUCOSE SERPL-MCNC: 93 MG/DL — SIGNIFICANT CHANGE UP (ref 70–99)
HCT VFR BLD CALC: 25.4 % — LOW (ref 39–50)
HGB BLD-MCNC: 8.1 G/DL — LOW (ref 13–17)
IRON SATN MFR SERPL: 37 UG/DL — LOW (ref 45–165)
KAPPA LC SER QL IFE: 11.7 MG/DL — HIGH (ref 0.33–1.94)
KAPPA/LAMBDA FREE LIGHT CHAIN RATIO, SERUM: 6.32 RATIO — HIGH (ref 0.26–1.65)
LAMBDA LC SER QL IFE: 1.85 MG/DL — SIGNIFICANT CHANGE UP (ref 0.57–2.63)
MCHC RBC-ENTMCNC: 27.4 PG — SIGNIFICANT CHANGE UP (ref 27–34)
MCHC RBC-ENTMCNC: 31.9 GM/DL — LOW (ref 32–36)
MCV RBC AUTO: 85.8 FL — SIGNIFICANT CHANGE UP (ref 80–100)
NRBC # BLD: 0 /100 WBCS — SIGNIFICANT CHANGE UP (ref 0–0)
PLATELET # BLD AUTO: 194 K/UL — SIGNIFICANT CHANGE UP (ref 150–400)
POTASSIUM SERPL-MCNC: 4.1 MMOL/L — SIGNIFICANT CHANGE UP (ref 3.5–5.3)
POTASSIUM SERPL-SCNC: 4.1 MMOL/L — SIGNIFICANT CHANGE UP (ref 3.5–5.3)
RBC # BLD: 2.96 M/UL — LOW (ref 4.2–5.8)
RBC # FLD: 18.1 % — HIGH (ref 10.3–14.5)
SODIUM SERPL-SCNC: 136 MMOL/L — SIGNIFICANT CHANGE UP (ref 135–145)
WBC # BLD: 3.58 K/UL — LOW (ref 3.8–10.5)
WBC # FLD AUTO: 3.58 K/UL — LOW (ref 3.8–10.5)

## 2018-04-19 PROCEDURE — 99221 1ST HOSP IP/OBS SF/LOW 40: CPT

## 2018-04-19 PROCEDURE — 99232 SBSQ HOSP IP/OBS MODERATE 35: CPT

## 2018-04-19 RX ADMIN — HEPARIN SODIUM 5000 UNIT(S): 5000 INJECTION INTRAVENOUS; SUBCUTANEOUS at 18:02

## 2018-04-19 RX ADMIN — HEPARIN SODIUM 5000 UNIT(S): 5000 INJECTION INTRAVENOUS; SUBCUTANEOUS at 06:12

## 2018-04-19 NOTE — CONSULT NOTE ADULT - PROBLEM SELECTOR RECOMMENDATION 9
Multiple Myeloma.  Myeloma Work Up.  Bone Marrow Bx.  Rehab.
-Pt to f/u with private oncologist on above date

## 2018-04-19 NOTE — PROGRESS NOTE ADULT - SUBJECTIVE AND OBJECTIVE BOX
CHIEF COMPLAINT/INTERVAL HISTORY:    Patient is a 89y old  Male who presents with a chief complaint of Hip pain. (17 Apr 2018 07:06)      HPI:  89 y/o Male PMH of HTN, melanoma with mets to bone s/p chemo & radiation, complains of right pelvic pain for 1 day. Patient states that he was transferring to his wheelchair and felt a pop in his right pelvic region, with right pelvic pain since then and difficulty ambulating. Patient denies numbness, paresthesias, head strike, loss of consciousness, bowel or bladder changes, numbness, tingling, paresthesias, saddle anesthesia or any other signs/symptoms at this time. Patient states that he was previously a household ambulator with a walker at baseline, but has rapidly declined in ambulation status within the past month, requiring a wheelchair within the past week. Patient complains of diffuse pains, but denies back pain or left hip pain. (17 Apr 2018 07:06)      SUBJECTIVE & OBJECTIVE: Pt seen and examined at bedside.     ICU Vital Signs Last 24 Hrs  T(C): 36.7 (19 Apr 2018 05:25), Max: 37.1 (18 Apr 2018 17:25)  T(F): 98.1 (19 Apr 2018 05:25), Max: 98.7 (18 Apr 2018 17:25)  HR: 73 (19 Apr 2018 05:25) (73 - 76)  BP: 110/64 (19 Apr 2018 05:25) (110/64 - 114/73)  BP(mean): --  ABP: --  ABP(mean): --  RR: 16 (19 Apr 2018 05:25) (16 - 17)  SpO2: 98% (19 Apr 2018 05:25) (95% - 98%)        MEDICATIONS  (STANDING):  heparin  Injectable 5000 Unit(s) SubCutaneous every 12 hours    MEDICATIONS  (PRN):  morphine  - Injectable 2 milliGRAM(s) IV Push every 6 hours PRN Severe Pain (7 - 10)  oxyCODONE    5 mG/acetaminophen 325 mG 1 Tablet(s) Oral every 4 hours PRN Moderate Pain (4 - 6)        PHYSICAL EXAM:    GENERAL: NAD, well-groomed, well-developed  HEAD:  Atraumatic, Normocephalic  EYES: EOMI, PERRLA, conjunctiva and sclera clear  ENMT: Moist mucous membranes  NECK: Supple, No JVD  NERVOUS SYSTEM:  Alert & Oriented X3, Motor Strength 5/5 B/L upper and lower extremities; DTRs 2+ intact and symmetric  CHEST/LUNG: Clear to auscultation bilaterally; No rales, rhonchi, wheezing, or rubs  HEART: Regular rate and rhythm; No murmurs, rubs, or gallops  ABDOMEN: Soft, Nontender, Nondistended; Bowel sounds present  EXTREMITIES:  2+ Peripheral Pulses, No clubbing, cyanosis, or edema    LABS:                        8.1    3.58  )-----------( 194      ( 19 Apr 2018 08:15 )             25.4     04-19    136  |  103  |  14  ----------------------------<  93  4.1   |  29  |  1.26    Ca    9.7      19 Apr 2018 08:15 CHIEF COMPLAINT/INTERVAL HISTORY:    Patient is a 89y old  Male who presents with a chief complaint of Hip pain. (17 Apr 2018 07:06)      HPI:  89 y/o Male PMH of HTN, melanoma with mets to bone s/p chemo & radiation, complains of right pelvic pain for 1 day. Patient states that he was transferring to his wheelchair and felt a pop in his right pelvic region, with right pelvic pain since then and difficulty ambulating. Patient denies numbness, paresthesias, head strike, loss of consciousness, bowel or bladder changes, numbness, tingling, paresthesias, saddle anesthesia or any other signs/symptoms at this time. Patient states that he was previously a household ambulator with a walker at baseline, but has rapidly declined in ambulation status within the past month, requiring a wheelchair within the past week. Patient complains of diffuse pains, but denies back pain or left hip pain. (17 Apr 2018 07:06)      SUBJECTIVE & OBJECTIVE: Pt seen and examined at bedside.   c/o pelvic pain, poor historian, denies any other complaints.     Vital Signs Last 24 Hrs  T(C): 36.7 (19 Apr 2018 05:25), Max: 37.1 (18 Apr 2018 17:25)  T(F): 98.1 (19 Apr 2018 05:25), Max: 98.7 (18 Apr 2018 17:25)  HR: 73 (19 Apr 2018 05:25) (73 - 76)  BP: 110/64 (19 Apr 2018 05:25) (110/64 - 114/73)  BP(mean): --  ABP: --  ABP(mean): --  RR: 16 (19 Apr 2018 05:25) (16 - 17)  SpO2: 98% (19 Apr 2018 05:25) (95% - 98%)        MEDICATIONS  (STANDING):  heparin  Injectable 5000 Unit(s) SubCutaneous every 12 hours    MEDICATIONS  (PRN):  morphine  - Injectable 2 milliGRAM(s) IV Push every 6 hours PRN Severe Pain (7 - 10)  oxyCODONE    5 mG/acetaminophen 325 mG 1 Tablet(s) Oral every 4 hours PRN Moderate Pain (4 - 6)        PHYSICAL EXAM:    GENERAL: NAD, elderly  HEAD:  Atraumatic, Normocephalic  EYES: EOMI, PERRLA, conjunctiva and sclera clear  ENMT: Moist mucous membranes  NECK: Supple, No JVD  NERVOUS SYSTEM:  Alert & Oriented X3, Motor Strength 4/5 B/L upper and lower extremities;   LUNGS; CTA b/l      LABS:                        8.1    3.58  )-----------( 194      ( 19 Apr 2018 08:15 )             25.4     04-19    136  |  103  |  14  ----------------------------<  93  4.1   |  29  |  1.26    Ca    9.7      19 Apr 2018 08:15 CHIEF COMPLAINT/INTERVAL HISTORY:    Patient is a 89y old  Male who presents with a chief complaint of Hip pain. (17 Apr 2018 07:06)      HPI:  89 y/o Male PMH of HTN, known h/o MM with mets to bone s/p chemo & radiation, complains of right pelvic pain for 1 day. Patient states that he was transferring to his wheelchair and felt a pop in his right pelvic region, with right pelvic pain since then and difficulty ambulating. Patient denies numbness, paresthesias, head strike, loss of consciousness, bowel or bladder changes, numbness, tingling, paresthesias, saddle anesthesia or any other signs/symptoms at this time. Patient states that he was previously a household ambulator with a walker at baseline, but has rapidly declined in ambulation status within the past month, requiring a wheelchair within the past week. Patient complains of diffuse pains, but denies back pain or left hip pain. (17 Apr 2018 07:06)      SUBJECTIVE & OBJECTIVE: Pt seen and examined at bedside.   c/o pelvic pain, poor historian, denies any other complaints.  No urinary or fecal incontinence.  No LE weakness/tingling/numbness.     Vital Signs Last 24 Hrs  T(C): 36.7 (19 Apr 2018 05:25), Max: 37.1 (18 Apr 2018 17:25)  T(F): 98.1 (19 Apr 2018 05:25), Max: 98.7 (18 Apr 2018 17:25)  HR: 73 (19 Apr 2018 05:25) (73 - 76)  BP: 110/64 (19 Apr 2018 05:25) (110/64 - 114/73)  BP(mean): --  ABP: --  ABP(mean): --  RR: 16 (19 Apr 2018 05:25) (16 - 17)  SpO2: 98% (19 Apr 2018 05:25) (95% - 98%)        MEDICATIONS  (STANDING):  heparin  Injectable 5000 Unit(s) SubCutaneous every 12 hours    MEDICATIONS  (PRN):  morphine  - Injectable 2 milliGRAM(s) IV Push every 6 hours PRN Severe Pain (7 - 10)  oxyCODONE    5 mG/acetaminophen 325 mG 1 Tablet(s) Oral every 4 hours PRN Moderate Pain (4 - 6)        PHYSICAL EXAM:    GENERAL: NAD, elderly  HEAD:  Atraumatic, Normocephalic  EYES: EOMI, PERRLA, conjunctiva and sclera clear  ENMT: Moist mucous membranes  NECK: Supple, No JVD  NERVOUS SYSTEM:  Alert & Oriented X3, Motor Strength 4/5 B/L upper and lower extremities;   LUNGS; CTA b/l      LABS:                        8.1    3.58  )-----------( 194      ( 19 Apr 2018 08:15 )             25.4     04-19    136  |  103  |  14  ----------------------------<  93  4.1   |  29  |  1.26    Ca    9.7      19 Apr 2018 08:15

## 2018-04-19 NOTE — PROGRESS NOTE ADULT - NSHPATTENDINGPLANDISCUSS_GEN_ALL_CORE
Dr. Gray
patient, called daughter GREG castro to call back. Likely dc plan would be to [facility]
patient, consultants and daughter/gloria

## 2018-04-19 NOTE — PROGRESS NOTE ADULT - SUBJECTIVE AND OBJECTIVE BOX
HPI:  89 y/o Male PMH of HTN, melanoma with mets to bone s/p chemo & radiation, complains of right pelvic pain for 1 day. Patient states that he was transferring to his wheelchair and felt a pop in his right pelvic region, with right pelvic pain since then and difficulty ambulating. Patient denies numbness, paresthesias, head strike, loss of consciousness, bowel or bladder changes, numbness, tingling, paresthesias, saddle anesthesia or any other signs/symptoms at this time. Patient states that he was previously a household ambulator with a walker at baseline, but has rapidly declined in ambulation status within the past month, requiring a wheelchair within the past week. Patient complains of diffuse pains, but denies back pain or left hip pain. (17 Apr 2018 07:06)  PAST MEDICAL & SURGICAL HISTORY:  Essential hypertension  Melanoma  No significant past surgical history  HPI:        SYMPTOMS---	                   DIDILITY    OTHER REVIEW OF SYSTEMS:  UNABLE TO OBTAIN  due to: SEDATION, CONFUSION    Baseline ADLs (prior to admission):  Independent [ ] moderately [ ] fully   Dependent   [ ] moderately [ ]fully    	                 T(C): 36.7 (04-19-18 @ 11:56), Max: 37.1 (04-18-18 @ 17:25)  T(F): 98.1 (04-19-18 @ 11:56), Max: 98.7 (04-18-18 @ 17:25)  HR: 70 (04-19-18 @ 11:56) (70 - 75)  BP: 115/64 (04-19-18 @ 11:56) (110/64 - 115/64)  RR: 17 (04-19-18 @ 11:56) (16 - 17)  SpO2: 98% (04-19-18 @ 11:56) (95% - 98%)  Wt(kg): --      GENERAL:    EYES : non icteric :               Other:     HEENT:      	atraumatic, normocephalic, PEERLA, sclera anicteric, dry oral mucosa   NECK:          Trach:        Vent:  CVS:          Tachypnic:               Bradycardic:              Normal:		   RESP:        tachypnic:                Dyspenic :                  Comfortable:	  GI:          NGT/PEG 	  :      cole      	  MUSC:       	Normal	Weakness	  Edema	   NEURO:     	No focal deficit	  Focal  PSYCH:           Alert	Oriented	  Lethargic     SKIN:         	Normal	  Other  LYMPH:      	Normal	  Other  	                     ALLERGIES: No Known Allergies    MEDICATIONS  (STANDING):  heparin  Injectable 5000 Unit(s) SubCutaneous every 12 hours    MEDICATIONS  (PRN):  morphine  - Injectable 2 milliGRAM(s) IV Push every 6 hours PRN Severe Pain (7 - 10)  oxyCODONE    5 mG/acetaminophen 325 mG 1 Tablet(s) Oral every 4 hours PRN Moderate Pain (4 - 6)    	                   LABS REVIEWED    H/H: 8.1/25.4   04-19 @ 08:15    BUN/CR: 14/1.26  04-19 @ 08:15    Albumin: --  04-19 @ 08:15    CRP/SED RATE: --/-- 04-19 @ 08:15    Sodium: 136  04-19 @ 08:15                  	  ADVANCED DIRECTIVES:   FULL CODE [   ]  DNR [ x ]    DNI x[  ]     MOLST [x  ]  PSYCHOSOCIAL-SPIRITUAL ASSESSMENT:       Reviewed       GOALS OF CARE DISCUSSION       Palliative care info/counseling provided	           Family meeting       Advanced Directives addressed	           See previous Palliative Medicine Note  	        REFERRALS	        Palliative Med        Unit SW/Case Mgmt              Speech/Swallow        Hospice             Nutrition         Functional Assessment: KPS: <30              PPS: poor      FINAL IMPRESSION AND RECOMMENDATIONS: adv age w MM w pathological fx of rt hip pos pain   waiting for bx and other w/u   sx controlled

## 2018-04-19 NOTE — CONSULT NOTE ADULT - ASSESSMENT
89 yo male, consulted by IR for a bone marrow biopsy.  As per daughter, the patient already has a h/o multiple myeloma, this confirmed with patients oncologist Dr. Mayen [phone]), whos states its IgG Kappa Multiple myeloma, diagnosed 2014.  Pt has a f/u appt on 5/3/18 @11:00A 89 yo male, consulted by IR for a bone marrow biopsy.  As per daughter, the patient already has a h/o multiple myeloma, this confirmed with patients oncologist Dr. Mayen [phone]), whos states its IgG Kappa Multiple myeloma, diagnosed 2014.  Pt has a f/u appt on 5/3/18 @11:00A.  The daughter does not want to pursue any further BMB's

## 2018-04-19 NOTE — PROGRESS NOTE ADULT - PROBLEM SELECTOR PLAN 6
follow ferritin/iron level  Likely anemia of malignancy
follow ferritin/iron level  Likely anemia of malignancy

## 2018-04-19 NOTE — PROGRESS NOTE ADULT - PROBLEM SELECTOR PLAN 2
Pt follows with Patricia [facility]  will get hem/onc eval.  prognosis guarded  palliative care consulted, pt request DNR/DNI
d/w pt and daughter Tiffanie in detail, pt follows with [facility] , PCP Edin and Oncologist also there, wants to keep fu at [facility], pt and family aware that pt has metastatic MM.  Pt onncologist Dr. Mayen [phone] ([phone]) at [facility], who states its IgG Kappa Multiple myeloma, diagnosed 2014.  Pt has a f/u appt on 5/3/18 @11:00A.  The daughter does not want to pursue any Rx here and wants to keep fu with [facility]/his primary oncologist.

## 2018-04-19 NOTE — PROGRESS NOTE ADULT - ASSESSMENT
89 y/o man with h/o melanoma metastatic to bone, presents with right pelvic bone pathological fracture. Pt seen and evaluated by orthopedics, not surgical candidate. 89 y/o man with h/o MM metastatic to bone, presents with right pelvic bone pathological fracture. Pt seen and evaluated by orthopedics, not surgical candidate.

## 2018-04-19 NOTE — CONSULT NOTE ADULT - SUBJECTIVE AND OBJECTIVE BOX
REASON FOR CONSULTATION:  Right hip Fracture.  Multiple-Myeloma.  Pt. on Revlimid @ [facility].  INTERVAL HISTORY:  Right Hip Pain.  Fracture.    Allergies    No Known Allergies    Intolerances        MEDICATIONS  (STANDING):  heparin  Injectable 5000 Unit(s) SubCutaneous every 12 hours    MEDICATIONS  (PRN):  morphine  - Injectable 2 milliGRAM(s) IV Push every 6 hours PRN Severe Pain (7 - 10)  oxyCODONE    5 mG/acetaminophen 325 mG 1 Tablet(s) Oral every 4 hours PRN Moderate Pain (4 - 6)      Vital Signs Last 24 Hrs  T(C): 36.7 (19 Apr 2018 05:25), Max: 37.1 (18 Apr 2018 17:25)  T(F): 98.1 (19 Apr 2018 05:25), Max: 98.7 (18 Apr 2018 17:25)  HR: 73 (19 Apr 2018 05:25) (73 - 76)  BP: 110/64 (19 Apr 2018 05:25) (110/64 - 114/73)  BP(mean): --  RR: 16 (19 Apr 2018 05:25) (16 - 17)  SpO2: 98% (19 Apr 2018 05:25) (95% - 98%)    PHYSICAL EXAM:    EYES: EOMI, PERRLA, conjunctiva and sclera clear  CHEST/LUNG: Clear to percussion bilaterally;   HEART: Regular rate and rhythm;   ABDOMEN: Soft, Nontender, Nondistended;   Unable to move Right Hip.        LABS:                        8.1    3.58  )-----------( 194      ( 19 Apr 2018 08:15 )             25.4     04-19    136  |  103  |  14  ----------------------------<  93  4.1   |  29  |  1.26    Ca    9.7      19 Apr 2018 08:15              RADIOLOGY & ADDITIONAL STUDIES:    PATHOLOGY:

## 2018-04-19 NOTE — CONSULT NOTE ADULT - SUBJECTIVE AND OBJECTIVE BOX
Patient is a 89y old  Male who presents with a chief complaint of Hip pain. (17 Apr 2018 07:06)    -IR consulted for bone marrow biopsy, r/o multiple myeloma.  Upon discussion with the daughter, she stated that he was already diagnosed with multiple myeloma.      HPI:  89 y/o Male PMH of HTN, melanoma with mets to bone s/p chemo & radiation, complains of right pelvic pain for 1 day. Patient states that he was transferring to his wheelchair and felt a pop in his right pelvic region, with right pelvic pain since then and difficulty ambulating. Patient denies numbness, paresthesias, head strike, loss of consciousness, bowel or bladder changes, numbness, tingling, paresthesias, saddle anesthesia or any other signs/symptoms at this time. Patient states that he was previously a household ambulator with a walker at baseline, but has rapidly declined in ambulation status within the past month, requiring a wheelchair within the past week. Patient complains of diffuse pains, but denies back pain or left hip pain. (17 Apr 2018 07:06)          PAST MEDICAL & SURGICAL HISTORY:  Essential hypertension  Melanoma  No significant past surgical history      Allergies    No Known Allergies    Intolerances        MEDICATIONS  (STANDING):  heparin  Injectable 5000 Unit(s) SubCutaneous every 12 hours    MEDICATIONS  (PRN):  morphine  - Injectable 2 milliGRAM(s) IV Push every 6 hours PRN Severe Pain (7 - 10)  oxyCODONE    5 mG/acetaminophen 325 mG 1 Tablet(s) Oral every 4 hours PRN Moderate Pain (4 - 6)        SOCIAL HISTORY:    FAMILY HISTORY:  No pertinent family history in first degree relatives        PHYSICAL EXAM:    Vital Signs Last 24 Hrs  T(C): 36.7 (19 Apr 2018 11:56), Max: 37.1 (18 Apr 2018 17:25)  T(F): 98.1 (19 Apr 2018 11:56), Max: 98.7 (18 Apr 2018 17:25)  HR: 70 (19 Apr 2018 11:56) (70 - 75)  BP: 115/64 (19 Apr 2018 11:56) (110/64 - 115/64)  BP(mean): --  RR: 17 (19 Apr 2018 11:56) (16 - 17)  SpO2: 98% (19 Apr 2018 11:56) (95% - 98%)        LABS:                        8.1    3.58  )-----------( 194      ( 19 Apr 2018 08:15 )             25.4     04-19    136  |  103  |  14  ----------------------------<  93  4.1   |  29  |  1.26    Ca    9.7      19 Apr 2018 08:15            RADIOLOGY & ADDITIONAL STUDIES:

## 2018-04-20 ENCOUNTER — TRANSCRIPTION ENCOUNTER (OUTPATIENT)
Age: 83
End: 2018-04-20

## 2018-04-20 VITALS
DIASTOLIC BLOOD PRESSURE: 76 MMHG | HEART RATE: 78 BPM | RESPIRATION RATE: 16 BRPM | OXYGEN SATURATION: 97 % | SYSTOLIC BLOOD PRESSURE: 114 MMHG | TEMPERATURE: 98 F

## 2018-04-20 LAB — B2 MICROGLOB SERPL-MCNC: 3.9 MG/L — HIGH (ref 0.8–2.2)

## 2018-04-20 PROCEDURE — 99239 HOSP IP/OBS DSCHRG MGMT >30: CPT

## 2018-04-20 RX ADMIN — HEPARIN SODIUM 5000 UNIT(S): 5000 INJECTION INTRAVENOUS; SUBCUTANEOUS at 05:31

## 2018-04-20 NOTE — DISCHARGE NOTE ADULT - CARE PROVIDER_API CALL
Edin,   Follow with PCP at [facility]  Phone: (   )    -  Fax: (   )    -    Tiarra,   follow with Oncologist at [facility]  Phone: (   )    -  Fax: (   )    -    Keyshawn Ornelas (DO), Orthopaedic Surgery  [address] [place] [address]  Phone: [phone]  Fax: [phone]

## 2018-04-20 NOTE — DISCHARGE NOTE ADULT - ADDITIONAL INSTRUCTIONS
Follow with PCP/Dr. Jesus at [facility] as soon as you get discharged from rehab.  follow with Attending Physician at [facility] x 1 day  Follow with primary Oncologist Dr. Mayen at [facility] as appt scheduled. Pt has a f/u appt on 5/3/18 @11:00A.  Follow with Orthopedics x 5 days from discharge.

## 2018-04-20 NOTE — DISCHARGE NOTE ADULT - PATIENT PORTAL LINK FT
You can access the [facility], offered by [facility], by registering with the following website: [email or web address]

## 2018-04-20 NOTE — PROGRESS NOTE ADULT - PROBLEM SELECTOR PLAN 1
- [facility]  - will follow up with him as outapteint  - supportive care
seen by ortho, pain management, PT eval
seen by ortho, pain management, PT eval- recommend [facility]

## 2018-04-20 NOTE — DISCHARGE NOTE ADULT - SECONDARY DIAGNOSIS.
BOY (acute kidney injury) Anemia, chronic disease Hypercalcemia of malignancy Essential hypertension Multiple myeloma, remission status unspecified

## 2018-04-20 NOTE — DISCHARGE NOTE ADULT - MEDICATION SUMMARY - MEDICATIONS TO TAKE
I will START or STAY ON the medications listed below when I get home from the hospital:    oxyCODONE-acetaminophen 5 mg-325 mg oral tablet  -- 1 tab(s) by mouth every 4 hours, As needed, Moderate Pain (4 - 6)  -- Indication: For Pathological fracture of ilium    heparin 5000 units/0.5 mL injectable solution  -- 5000 unit(s) subcutaneous 3 times a day for dvt prophylaxis  -- Indication: For dvt prophylaxis    Calcium 600+D 600 mg-200 intl units oral tablet  -- 1 tab(s) by mouth 3 times a day   -- Indication: For supplement I will START or STAY ON the medications listed below when I get home from the hospital:    oxyCODONE-acetaminophen 5 mg-325 mg oral tablet  -- 1 tab(s) by mouth every 4 hours, As needed, Moderate Pain (4 - 6)  -- Indication: For Pain    heparin 5000 units/0.5 mL injectable solution  -- 5000 unit(s) subcutaneous 3 times a day for dvt prophylaxis  -- Indication: For dvt prophylaxis    Calcium 600+D 600 mg-200 intl units oral tablet  -- 1 tab(s) by mouth 3 times a day   -- Indication: For supplement

## 2018-04-20 NOTE — DISCHARGE NOTE ADULT - PLAN OF CARE
follow with primary Oncologist at [facility] to continue MM treatment. complete recovery Pain control  Rehab /PT at [facility]  Follow with PCP and Orthopedics  Continue your Multiple myeloma Treatment Improved with IV hydration anemia sec to malignancy  follow with PCP and Oncologist improved with IV hydration BP stable off medicines.  Monitor BP at [facility] and assess for need to start any meds if BP uptrending.

## 2018-04-20 NOTE — DISCHARGE NOTE ADULT - PROVIDER TOKENS
FREE:[LAST:[Edin],PHONE:[(   )    -],FAX:[(   )    -],ADDRESS:[Follow with PCP at [facility]]],FREE:[LAST:[Tiarra],PHONE:[(   )    -],FAX:[(   )    -],ADDRESS:[follow with Oncologist at [facility]]],TOKEN:'[id number]'

## 2018-04-20 NOTE — DISCHARGE NOTE ADULT - CARE PLAN
Principal Discharge DX:	Closed fracture of right iliac wing, initial encounter  Goal:	complete recovery  Assessment and plan of treatment:	Pain control  Rehab /PT at [facility]  Follow with PCP and Orthopedics  Continue your Multiple myeloma Treatment  Secondary Diagnosis:	BOY (acute kidney injury)  Assessment and plan of treatment:	Improved with IV hydration  Secondary Diagnosis:	Anemia, chronic disease  Assessment and plan of treatment:	anemia sec to malignancy  follow with PCP and Oncologist  Secondary Diagnosis:	Hypercalcemia of malignancy  Assessment and plan of treatment:	improved with IV hydration  Secondary Diagnosis:	Essential hypertension  Assessment and plan of treatment:	BP stable off medicines.  Monitor BP at [facility] and assess for need to start any meds if BP uptrending.  Secondary Diagnosis:	Multiple myeloma, remission status unspecified  Assessment and plan of treatment:	follow with primary Oncologist at [facility] to continue MM treatment.

## 2018-04-20 NOTE — DISCHARGE NOTE ADULT - CONDITION (STATED IN TERMS THAT PERMIT A SPECIFIC MEASURABLE COMPARISON WITH CONDITION ON ADMISSION):
Vital Signs Last 24 Hrs  T(C): 36.6 (20 Apr 2018 11:37), Max: 36.6 (19 Apr 2018 19:14)  T(F): 97.8 (20 Apr 2018 11:37), Max: 97.9 (19 Apr 2018 19:14)  HR: 78 (20 Apr 2018 11:37) (75 - 82)  BP: 114/76 (20 Apr 2018 11:37) (106/56 - 115/62)  BP(mean): --  RR: 16 (20 Apr 2018 11:37) (16 - 18)  SpO2: 97% (20 Apr 2018 11:37) (96% - 97%)  Patient is stable: tolerating diet, voiding,  pain well controlled.  60 minutes spent in direct patient care including physical examination, discharge instructions , medication instructions and follow up, patient and daughter verbalized understanding and agreed.

## 2018-04-20 NOTE — DISCHARGE NOTE ADULT - HOSPITAL COURSE
89 y/o man with h/o MM metastatic to bone s/p chemo/RT, h/o right femur IMN , presents with right pelvic bone pathological fracture sec to MM. Pt seen and evaluated by orthopedics, not surgical candidate. seen by PT and recommend [facility].     Problem/Plan - 1:  ·  Problem: Closed fracture of right iliac wing, initial encounter.  Plan: seen by ortho, pain management, no surg intervention as per Ortho. PT eval- recommend [facility]. DVT px with SQ heparin.     Problem/Plan - 2:  ·  Problem: Multiple myeloma, remission status unspecified.  Plan: d/w pt and daughter Tiffanie in detail, pt follows with [facility] , PCP Edin and Oncologist also there, wants to keep fu at [facility], pt and family aware that pt has metastatic MM to bones.  Pt oncologist Dr. Mayen [phone]) at [facility], who states its IgG Kappa Multiple myeloma, diagnosed 2014.  Pt has a f/u appt on 5/3/18 @11:00A.  The daughter does not want to pursue any Rx here and wants to keep fu with [facility]/his primary oncologist.      Problem/Plan - 3:  ·  Problem: Essential hypertension.  Plan: BP stable off meds  To be monitored at [facility].     Problem/Plan - 4:  ·  Problem: BOY (acute kidney injury).  Plan: Creat improved s/p IVF.      Problem/Plan - 5:  ·  Problem: Hypercalcemia of malignancy.  Plan: improved with IV hydration.      Problem/Plan - 6:  Problem: Anemia, chronic disease. Plan: ferritin 206.  Likely anemia of malignancy. follow with PCP and oncologist for monitoring h/h

## 2018-04-21 LAB
PROT SERPL-MCNC: 8.4 G/DL — HIGH (ref 6–8.3)
PROT SERPL-MCNC: 8.4 G/DL — HIGH (ref 6–8.3)

## 2018-04-23 LAB
% ALBUMIN: 36.1 % — SIGNIFICANT CHANGE UP
% ALPHA 1: 4.5 % — SIGNIFICANT CHANGE UP
% ALPHA 2: 9.4 % — SIGNIFICANT CHANGE UP
% BETA: 14.8 % — SIGNIFICANT CHANGE UP
% GAMMA: 35.2 % — SIGNIFICANT CHANGE UP
% M SPIKE: 30.7 % — SIGNIFICANT CHANGE UP
ALBUMIN SERPL ELPH-MCNC: 3 G/DL — LOW (ref 3.6–5.5)
ALBUMIN/GLOB SERPL ELPH: 0.6 RATIO — SIGNIFICANT CHANGE UP
ALPHA1 GLOB SERPL ELPH-MCNC: 0.4 G/DL — SIGNIFICANT CHANGE UP (ref 0.1–0.4)
ALPHA2 GLOB SERPL ELPH-MCNC: 0.8 G/DL — SIGNIFICANT CHANGE UP (ref 0.5–1)
B-GLOBULIN SERPL ELPH-MCNC: 1.2 G/DL — HIGH (ref 0.5–1)
GAMMA GLOBULIN: 3 G/DL — HIGH (ref 0.6–1.6)
M-SPIKE: 2.6 G/DL — HIGH (ref 0–0)
PROT PATTERN SERPL ELPH-IMP: SIGNIFICANT CHANGE UP

## 2018-04-24 DIAGNOSIS — M84.550A PATHOLOGICAL FRACTURE IN NEOPLASTIC DISEASE, PELVIS, INITIAL ENCOUNTER FOR FRACTURE: ICD-10-CM

## 2018-04-24 DIAGNOSIS — Z51.5 ENCOUNTER FOR PALLIATIVE CARE: ICD-10-CM

## 2018-04-24 DIAGNOSIS — I10 ESSENTIAL (PRIMARY) HYPERTENSION: ICD-10-CM

## 2018-04-24 DIAGNOSIS — C90.00 MULTIPLE MYELOMA NOT HAVING ACHIEVED REMISSION: ICD-10-CM

## 2018-04-24 DIAGNOSIS — Z66 DO NOT RESUSCITATE: ICD-10-CM

## 2018-04-24 DIAGNOSIS — D63.0 ANEMIA IN NEOPLASTIC DISEASE: ICD-10-CM

## 2018-04-24 DIAGNOSIS — C79.51 SECONDARY MALIGNANT NEOPLASM OF BONE: ICD-10-CM

## 2018-04-24 DIAGNOSIS — Z92.3 PERSONAL HISTORY OF IRRADIATION: ICD-10-CM

## 2018-04-24 DIAGNOSIS — Z92.21 PERSONAL HISTORY OF ANTINEOPLASTIC CHEMOTHERAPY: ICD-10-CM

## 2018-04-24 DIAGNOSIS — N17.9 ACUTE KIDNEY FAILURE, UNSPECIFIED: ICD-10-CM

## 2018-04-24 DIAGNOSIS — D63.8 ANEMIA IN OTHER CHRONIC DISEASES CLASSIFIED ELSEWHERE: ICD-10-CM

## 2018-04-24 DIAGNOSIS — Z85.820 PERSONAL HISTORY OF MALIGNANT MELANOMA OF SKIN: ICD-10-CM

## 2018-04-24 DIAGNOSIS — E83.52 HYPERCALCEMIA: ICD-10-CM

## 2018-04-27 ENCOUNTER — INPATIENT (INPATIENT)
Facility: HOSPITAL | Age: 83
LOS: 3 days | Discharge: NURSING, REHAB OR LONG-TERM CARE | End: 2018-05-01
Attending: INTERNAL MEDICINE | Admitting: INTERNAL MEDICINE
Payer: MEDICARE

## 2018-04-27 VITALS
HEART RATE: 78 BPM | SYSTOLIC BLOOD PRESSURE: 115 MMHG | OXYGEN SATURATION: 95 % | TEMPERATURE: 98 F | RESPIRATION RATE: 16 BRPM | DIASTOLIC BLOOD PRESSURE: 69 MMHG

## 2018-04-27 DIAGNOSIS — N17.9 ACUTE KIDNEY FAILURE, UNSPECIFIED: ICD-10-CM

## 2018-04-27 DIAGNOSIS — D63.0 ANEMIA IN NEOPLASTIC DISEASE: ICD-10-CM

## 2018-04-27 DIAGNOSIS — E86.0 DEHYDRATION: ICD-10-CM

## 2018-04-27 DIAGNOSIS — C90.00 MULTIPLE MYELOMA NOT HAVING ACHIEVED REMISSION: ICD-10-CM

## 2018-04-27 DIAGNOSIS — E83.52 HYPERCALCEMIA: ICD-10-CM

## 2018-04-27 LAB
ALBUMIN SERPL ELPH-MCNC: 3.1 G/DL — LOW (ref 3.3–5)
ALP SERPL-CCNC: 69 U/L — SIGNIFICANT CHANGE UP (ref 40–120)
ALT FLD-CCNC: 27 U/L — SIGNIFICANT CHANGE UP (ref 12–78)
ANION GAP SERPL CALC-SCNC: 5 MMOL/L — SIGNIFICANT CHANGE UP (ref 5–17)
APTT BLD: 24.8 SEC — LOW (ref 27.5–37.4)
AST SERPL-CCNC: 70 U/L — HIGH (ref 15–37)
BASOPHILS # BLD AUTO: 0.01 K/UL — SIGNIFICANT CHANGE UP (ref 0–0.2)
BASOPHILS NFR BLD AUTO: 0.2 % — SIGNIFICANT CHANGE UP (ref 0–2)
BILIRUB SERPL-MCNC: 0.4 MG/DL — SIGNIFICANT CHANGE UP (ref 0.2–1.2)
BUN SERPL-MCNC: 49 MG/DL — HIGH (ref 7–23)
CALCIUM SERPL-MCNC: 14.4 MG/DL — CRITICAL HIGH (ref 8.5–10.1)
CHLORIDE SERPL-SCNC: 106 MMOL/L — SIGNIFICANT CHANGE UP (ref 96–108)
CO2 SERPL-SCNC: 30 MMOL/L — SIGNIFICANT CHANGE UP (ref 22–31)
CREAT SERPL-MCNC: 2.61 MG/DL — HIGH (ref 0.5–1.3)
EOSINOPHIL # BLD AUTO: 0.1 K/UL — SIGNIFICANT CHANGE UP (ref 0–0.5)
EOSINOPHIL NFR BLD AUTO: 2 % — SIGNIFICANT CHANGE UP (ref 0–6)
GLUCOSE SERPL-MCNC: 106 MG/DL — HIGH (ref 70–99)
HCT VFR BLD CALC: 28.5 % — LOW (ref 39–50)
HGB BLD-MCNC: 9 G/DL — LOW (ref 13–17)
IMM GRANULOCYTES NFR BLD AUTO: 0.6 % — SIGNIFICANT CHANGE UP (ref 0–1.5)
INR BLD: 1.12 RATIO — SIGNIFICANT CHANGE UP (ref 0.88–1.16)
LYMPHOCYTES # BLD AUTO: 0.57 K/UL — LOW (ref 1–3.3)
LYMPHOCYTES # BLD AUTO: 11.3 % — LOW (ref 13–44)
MCHC RBC-ENTMCNC: 27.7 PG — SIGNIFICANT CHANGE UP (ref 27–34)
MCHC RBC-ENTMCNC: 31.6 GM/DL — LOW (ref 32–36)
MCV RBC AUTO: 87.7 FL — SIGNIFICANT CHANGE UP (ref 80–100)
MONOCYTES # BLD AUTO: 0.68 K/UL — SIGNIFICANT CHANGE UP (ref 0–0.9)
MONOCYTES NFR BLD AUTO: 13.4 % — SIGNIFICANT CHANGE UP (ref 2–14)
NEUTROPHILS # BLD AUTO: 3.67 K/UL — SIGNIFICANT CHANGE UP (ref 1.8–7.4)
NEUTROPHILS NFR BLD AUTO: 72.5 % — SIGNIFICANT CHANGE UP (ref 43–77)
NRBC # BLD: 0 /100 WBCS — SIGNIFICANT CHANGE UP (ref 0–0)
PLATELET # BLD AUTO: 221 K/UL — SIGNIFICANT CHANGE UP (ref 150–400)
POTASSIUM SERPL-MCNC: 5.2 MMOL/L — SIGNIFICANT CHANGE UP (ref 3.5–5.3)
POTASSIUM SERPL-SCNC: 5.2 MMOL/L — SIGNIFICANT CHANGE UP (ref 3.5–5.3)
PROT SERPL-MCNC: 10.8 GM/DL — HIGH (ref 6–8.3)
PROTHROM AB SERPL-ACNC: 12.2 SEC — SIGNIFICANT CHANGE UP (ref 9.8–12.7)
RBC # BLD: 3.25 M/UL — LOW (ref 4.2–5.8)
RBC # FLD: 18.2 % — HIGH (ref 10.3–14.5)
SODIUM SERPL-SCNC: 141 MMOL/L — SIGNIFICANT CHANGE UP (ref 135–145)
WBC # BLD: 5.06 K/UL — SIGNIFICANT CHANGE UP (ref 3.8–10.5)
WBC # FLD AUTO: 5.06 K/UL — SIGNIFICANT CHANGE UP (ref 3.8–10.5)

## 2018-04-27 PROCEDURE — 99285 EMERGENCY DEPT VISIT HI MDM: CPT

## 2018-04-27 RX ORDER — SENNA PLUS 8.6 MG/1
2 TABLET ORAL AT BEDTIME
Qty: 0 | Refills: 0 | Status: DISCONTINUED | OUTPATIENT
Start: 2018-04-27 | End: 2018-05-01

## 2018-04-27 RX ORDER — HEPARIN SODIUM 5000 [USP'U]/ML
5000 INJECTION INTRAVENOUS; SUBCUTANEOUS EVERY 12 HOURS
Qty: 0 | Refills: 0 | Status: DISCONTINUED | OUTPATIENT
Start: 2018-04-27 | End: 2018-05-01

## 2018-04-27 RX ORDER — MAGNESIUM HYDROXIDE 400 MG/1
30 TABLET, CHEWABLE ORAL DAILY
Qty: 0 | Refills: 0 | Status: DISCONTINUED | OUTPATIENT
Start: 2018-04-27 | End: 2018-05-01

## 2018-04-27 RX ORDER — FUROSEMIDE 40 MG
40 TABLET ORAL ONCE
Qty: 0 | Refills: 0 | Status: COMPLETED | OUTPATIENT
Start: 2018-04-27 | End: 2018-04-27

## 2018-04-27 RX ORDER — DEXAMETHASONE 0.5 MG/5ML
8 ELIXIR ORAL THREE TIMES A DAY
Qty: 0 | Refills: 0 | Status: DISCONTINUED | OUTPATIENT
Start: 2018-04-27 | End: 2018-05-01

## 2018-04-27 RX ORDER — OXYCODONE AND ACETAMINOPHEN 5; 325 MG/1; MG/1
1 TABLET ORAL EVERY 4 HOURS
Qty: 0 | Refills: 0 | Status: DISCONTINUED | OUTPATIENT
Start: 2018-04-27 | End: 2018-05-01

## 2018-04-27 RX ORDER — SODIUM CHLORIDE 9 MG/ML
1000 INJECTION INTRAMUSCULAR; INTRAVENOUS; SUBCUTANEOUS
Qty: 0 | Refills: 0 | Status: DISCONTINUED | OUTPATIENT
Start: 2018-04-27 | End: 2018-04-30

## 2018-04-27 RX ADMIN — SODIUM CHLORIDE 125 MILLILITER(S): 9 INJECTION INTRAMUSCULAR; INTRAVENOUS; SUBCUTANEOUS at 20:06

## 2018-04-27 RX ADMIN — SODIUM CHLORIDE 125 MILLILITER(S): 9 INJECTION INTRAMUSCULAR; INTRAVENOUS; SUBCUTANEOUS at 22:51

## 2018-04-27 RX ADMIN — SENNA PLUS 2 TABLET(S): 8.6 TABLET ORAL at 22:51

## 2018-04-27 RX ADMIN — Medication 8 MILLIGRAM(S): at 22:51

## 2018-04-27 RX ADMIN — Medication 40 MILLIGRAM(S): at 20:07

## 2018-04-27 NOTE — ED ADULT NURSE REASSESSMENT NOTE - NS ED NURSE REASSESS COMMENT FT1
patient is a very hard stick, was able to send labs, unable to put an iv line, md santamaria notified
1931 assumed care at this time, patient is alert and oriented name, place and situation, disoriented to time, no complaint made, no IV access at this time

## 2018-04-27 NOTE — ED PROVIDER NOTE - OBJECTIVE STATEMENT
89 year old male presents today sent from [facility] for calcium of 14 (-) chest pain (-) sob (-) nausea or vomiting

## 2018-04-27 NOTE — H&P ADULT - HISTORY OF PRESENT ILLNESS
89 yearold male, DNR status, from [facility], . sent to ED because of hypercalcemia.. Patient has hx of Multiple myeloma with bone involvement. Was admitted this month for Right iliac bone fx and Hypercalcemia and BOY.    Pt oncologist Dr. Mayen [phone] ([id number]) at [facility], who states its IgG Kappa Multiple myeloma, diagnosed 2014.  Pt has a f/u appt on 5/3/18 @11:00A.  The daughter does not want to pursue any Rx here and wants to keep fu with [facility]/his primary oncologist.

## 2018-04-27 NOTE — H&P ADULT - NSHPLABSRESULTS_GEN_ALL_CORE
9.0                  x    | x    | x            5.06  >-----------< 221     ------------------------< x                     28.5                 x    | x    | x                                            Ca x     Mg x     Ph x    04-27 @ 17:12    141 | 106 | 49  /14.4 | -- | --  _______________________/  5.2 | 30 | 2.61 \106  EKG sinus rhythm. QT

## 2018-04-27 NOTE — H&P ADULT - NSHPPHYSICALEXAM_GEN_ALL_CORE
General: A/ox 3, No acute Distress  skin : Normal  Head. Kari. No lacerations  Neck: Supple, NO JVD  Cardiac: S1 S2, No M/R/G  Pulmonary: CTAP, Breathing unlabored, No Rhonchi/Rales/Wheezing  Abdomen: Soft, Non -tender, +BS x 4 quads  Extremities: No Rashes, No edema  Neuro: A/o x 3, No focal deficits. Normal Motor and sensory exam  Vascular: Normal distal pulses.  Extremities: No edema  Decubiti ; None

## 2018-04-27 NOTE — ED ADULT NURSE NOTE - OBJECTIVE STATEMENT
patient received, alert and oriented to person, place and time, was sent by nursing home for elevated calcium level. denies pain, denies n/v/d, denies dizziness.

## 2018-04-28 LAB
ANION GAP SERPL CALC-SCNC: 4 MMOL/L — LOW (ref 5–17)
BUN SERPL-MCNC: 46 MG/DL — HIGH (ref 7–23)
CALCIUM SERPL-MCNC: 12.9 MG/DL — HIGH (ref 8.5–10.1)
CHLORIDE SERPL-SCNC: 107 MMOL/L — SIGNIFICANT CHANGE UP (ref 96–108)
CO2 SERPL-SCNC: 30 MMOL/L — SIGNIFICANT CHANGE UP (ref 22–31)
CREAT SERPL-MCNC: 2.08 MG/DL — HIGH (ref 0.5–1.3)
GLUCOSE SERPL-MCNC: 111 MG/DL — HIGH (ref 70–99)
HCT VFR BLD CALC: 27.7 % — LOW (ref 39–50)
HGB BLD-MCNC: 8.7 G/DL — LOW (ref 13–17)
MCHC RBC-ENTMCNC: 28 PG — SIGNIFICANT CHANGE UP (ref 27–34)
MCHC RBC-ENTMCNC: 31.4 GM/DL — LOW (ref 32–36)
MCV RBC AUTO: 89.1 FL — SIGNIFICANT CHANGE UP (ref 80–100)
NRBC # BLD: 0 /100 WBCS — SIGNIFICANT CHANGE UP (ref 0–0)
PLATELET # BLD AUTO: 248 K/UL — SIGNIFICANT CHANGE UP (ref 150–400)
POTASSIUM SERPL-MCNC: 4 MMOL/L — SIGNIFICANT CHANGE UP (ref 3.5–5.3)
POTASSIUM SERPL-SCNC: 4 MMOL/L — SIGNIFICANT CHANGE UP (ref 3.5–5.3)
RBC # BLD: 3.11 M/UL — LOW (ref 4.2–5.8)
RBC # FLD: 18.1 % — HIGH (ref 10.3–14.5)
SODIUM SERPL-SCNC: 141 MMOL/L — SIGNIFICANT CHANGE UP (ref 135–145)
WBC # BLD: 4.57 K/UL — SIGNIFICANT CHANGE UP (ref 3.8–10.5)
WBC # FLD AUTO: 4.57 K/UL — SIGNIFICANT CHANGE UP (ref 3.8–10.5)

## 2018-04-28 PROCEDURE — 93010 ELECTROCARDIOGRAM REPORT: CPT

## 2018-04-28 RX ORDER — FUROSEMIDE 40 MG
40 TABLET ORAL ONCE
Qty: 0 | Refills: 0 | Status: COMPLETED | OUTPATIENT
Start: 2018-04-28 | End: 2018-04-28

## 2018-04-28 RX ADMIN — HEPARIN SODIUM 5000 UNIT(S): 5000 INJECTION INTRAVENOUS; SUBCUTANEOUS at 17:32

## 2018-04-28 RX ADMIN — SODIUM CHLORIDE 125 MILLILITER(S): 9 INJECTION INTRAMUSCULAR; INTRAVENOUS; SUBCUTANEOUS at 05:36

## 2018-04-28 RX ADMIN — Medication 8 MILLIGRAM(S): at 05:36

## 2018-04-28 RX ADMIN — SENNA PLUS 2 TABLET(S): 8.6 TABLET ORAL at 21:48

## 2018-04-28 RX ADMIN — SODIUM CHLORIDE 125 MILLILITER(S): 9 INJECTION INTRAMUSCULAR; INTRAVENOUS; SUBCUTANEOUS at 21:48

## 2018-04-28 RX ADMIN — Medication 8 MILLIGRAM(S): at 21:48

## 2018-04-28 RX ADMIN — Medication 8 MILLIGRAM(S): at 14:51

## 2018-04-28 RX ADMIN — HEPARIN SODIUM 5000 UNIT(S): 5000 INJECTION INTRAVENOUS; SUBCUTANEOUS at 05:36

## 2018-04-28 RX ADMIN — Medication 40 MILLIGRAM(S): at 09:50

## 2018-04-28 NOTE — CONSULT NOTE ADULT - SUBJECTIVE AND OBJECTIVE BOX
Nephrology Consultation: MD JOHANNA Mulligan, ULRIC  89y    HPI:  89 yearold male, DNR status, from [facility], . sent to ED because of hypercalcemia.. Patient has hx of Multiple myeloma with bone involvement. Was admitted this month for Right iliac bone fx and Hypercalcemia and BOY.    Pt oncologist Dr. Mayen [phone] ([id number]) at [facility], who states its IgG Kappa Multiple myeloma, diagnosed 2014.  Pt has a f/u appt on 5/3/18 @11:00A.  The daughter does not want to pursue any Rx here and wants to keep fu with [facility]/his primary oncologist. (27 Apr 2018 19:25)    spoke to patient's son at bed side.       PAST MEDICAL & SURGICAL HISTORY:  Multiple myeloma, failed remission  Essential hypertension  Melanoma  No significant past surgical history      Allergies    No Known Allergies    Intolerances        Home Medications:  heparin 5000 units/0.5 mL injectable solution: 5000 unit(s) subcutaneous 3 times a day for dvt prophylaxis (20 Apr 2018 12:33)  oxyCODONE-acetaminophen 5 mg-325 mg oral tablet: 1 tab(s) orally every 4 hours, As needed, Moderate Pain (4 - 6) (20 Apr 2018 12:22)        FAMILY HISTORY:  No pertinent family history in first degree relatives      SOCIAL HISTORY:    REVIEW OF SYSTEMS:    Constitutional: No fever, weight loss or fatigue  Eyes: No eye pain, visual disturbances, or discharge  ENT:  No difficulty hearing, tinnitus, vertigo; No sinus or throat pain  Neck: No pain or stiffness  Breasts: No pain, masses or nipple discharge  Respiratory: No cough, wheezing, chills or hemoptysis  Cardiovascular: No chest pain, palpitations, shortness of breath, dizziness or leg swelling  Gastrointestinal: No abdominal or epigastric pain. No nausea, vomiting or hematemesis; No diarrhea or constipation. No melena or hematochezia.  Genitourinary: No dysuria, frequency, hematuria or incontinence  Rectal: No pain, hemorrhoids or incontinence  Neurological: No headaches, memory loss, loss of strength, numbness or tremors  Skin: No itching, burning, rashes or lesions   Lymph Nodes: No enlarged glands  Endocrine: No heat or cold intolerance; No hair loss  Musculoskeletal: No joint pain or swelling; No muscle, back or extremity pain  Psychiatric: No depression, anxiety, mood swings or difficulty sleeping  Heme/Lymph: No easy bruising or bleeding gums  Allergy and Immunologic: No hives or eczema    dexamethasone     Tablet 8 milliGRAM(s) Oral three times a day  heparin  Injectable 5000 Unit(s) SubCutaneous every 12 hours  magnesium hydroxide Suspension 30 milliLiter(s) Oral daily PRN  oxyCODONE    5 mG/acetaminophen 325 mG 1 Tablet(s) Oral every 4 hours PRN  senna 2 Tablet(s) Oral at bedtime  sodium chloride 0.9%. 1000 milliLiter(s) IV Continuous <Continuous>      Vital Signs Last 24 Hrs  T(C): 36.2 (28 Apr 2018 17:06), Max: 36.8 (28 Apr 2018 11:05)  T(F): 97.1 (28 Apr 2018 17:06), Max: 98.2 (28 Apr 2018 11:05)  HR: 77 (28 Apr 2018 17:06) (70 - 85)  BP: 123/83 (28 Apr 2018 17:06) (106/65 - 134/79)  BP(mean): --  RR: 18 (28 Apr 2018 17:06) (16 - 18)  SpO2: 100% (28 Apr 2018 17:06) (95% - 100%)    PHYSICAL EXAM:    Constitutional: NAD, well-groomed, well-developed  HEENT: PERRLA, EOMI, Normal Hearing, MMM  Neck: No LAD, No JVD  Back: Normal spine flexure, No CVA tenderness  Respiratory: CTAB/L   Cardiovascular: S1 and S2, RRR, no M/G/R  Gastrointestinal: BS+, soft, NT/ND  Extremities: No peripheral edema  Vascular: 2+ peripheral pulses  Neurological: A/O x 3, no focal deficits  Skin: No rashes      LABS:                        8.7    4.57  )-----------( 248      ( 28 Apr 2018 07:33 )             27.7     04-28    141  |  107  |  46<H>  ----------------------------<  111<H>  4.0   |  30  |  2.08<H>    Ca    12.9<H>      28 Apr 2018 07:33    TPro  10.8<H>  /  Alb  3.1<L>  /  TBili  0.4  /  DBili  x   /  AST  70<H>  /  ALT  27  /  AlkPhos  69  04-27    PT/INR - ( 27 Apr 2018 17:12 )   PT: 12.2 sec;   INR: 1.12 ratio         PTT - ( 27 Apr 2018 17:12 )  PTT:24.8 sec      MICROBIOLOGY:  RECENT CULTURES:        RADIOLOGY & ADDITIONAL STUDIES:

## 2018-04-28 NOTE — CONSULT NOTE ADULT - ASSESSMENT
89 male with a history of right pelvic pain and found to have a fracture. He also has a history of multiple myeloma and now with BOY on CKD with hypercalcemia. Family does not want any aggressive treatment. Will continue hydration and keep him on comfort care. spoke to family at bed side. will follow closely.

## 2018-04-28 NOTE — PATIENT PROFILE ADULT. - FUNCTIONAL SCREEN CURRENT LEVEL: AMBULATION, MLM
Progress Note - Nephrology   Ragini Mancilla 72 y o  male MRN: [id number]  Unit/Bed#: [facility] 627-01 Encounter: [id number]      Assessment / Plan:    1  End-stage renal disease  · The patient was seen examined on hemodialysis at 8:50 a m  His access blood flow was acceptable  He was hemodynamically stable  2  Volume overload  · Volume removal with dialysis as tolerated by blood pressure  3  Anemia -last dose of Micera was 100 mcg and is given on every 4 week schedule   Last dose was on 02/05  · Trend hemoglobin for now, stable overall  · Continue Venofer on dialysis  4  Sepsis  · On meropenem by primary service for Pseudomonas UTI  5  Hyponatremia  · Chronic in nature  · Likely secondary to volume overload  · Sodium level somewhat improved somewhat  · Continue fluid restriction   · Note that Celexa can cause hyponatremia as well and if the sodium level does not significantly improve or normalize, may need to rethink use of this medicine  However, will defer to his primary nephrology team, Dr Halie sarmiento  6  Hypertension  · Acceptable to low at times  7  The patient's heart rate was running in the 50s  Metoprolol dose was decreased on 02/02 and heart rate has improved somewhat  8  C diff infection  · On Vancomycin suppressive therapy  8  Diabetes mellitus  9  Neuropathy  10  Cardiomyopathy with history of systolic and diastolic congestive heart failure  11  Atrial fibrillation  12  Confusion/myoclonic jerking- this could be a manifestation of Neurontin toxicity   Neurontin dose has been decreased from 300-200 mg as of 1/31  [facility] had been on ciprofloxacin prior to admission and this too can cause confusion in dialysis patients but this was discontinued 7 days prior to admission   Of course, sepsis can cause confusion as well    Overall, mental status has improved but myoclonic jerking continues  Will check Neurontin level    If level is acceptable, would check URR, and ? seek neurologic input         Subjective: The patient was seen examined on hemodialysis at 8:50 a m  He felt well denied any shortness of breath, chest pain or pressure, abdominal pain, vomiting, diarrhea, chills, sweats, paroxysmal nocturnal dyspnea  His myoclonic jerking still persists and has not changed  Objective:     Vitals: Blood pressure (!) 90/48, pulse 64, temperature (!) 65 °F (18 3 °C), resp  rate 20, height 5' 6" (1 676 m), weight 66 4 kg (146 lb 6 2 oz), SpO2 92 %  ,Body mass index is 23 63 kg/m²  Temp (24hrs), Av 5 °F (31 9 °C), Min:65 °F (18 3 °C), Max:97 9 °F (36 6 °C)      Weight (last 2 days)     None                 Intake/Output Summary (Last 24 hours) at 18 1008  Last data filed at 18 0800   Gross per 24 hour   Intake              665 ml   Output                0 ml   Net              665 ml     I/O last 24 hours:   In: 719 [P O :465; I V :200; NG/GT:20]  Out: 0         Physical Exam    Gen -awake, alert and in no apparent distress  Cardiovascular -S1-S2, no pericardial friction rub or S3 were appreciated, trace edema bilaterally in the lower extremities  Respiratory -  clear to auscultation percussion, no rales/rhonchi or wheezing were noted  GI - Abdomen -soft, nontender, no rebound or guarding was noted, normoactive bowel sounds        Invasive Devices     Peripheral Intravenous Line            Peripheral IV 18 Left Arm 2 days          Line            Hemodialysis AV Fistula  Right Forearm -- days    Hemodialysis AV Fistula Right Forearm -- days          Drain            Gastrostomy/Enterostomy -- days    Gastrostomy/Enterostomy Percutaneous endoscopic gastrostomy (PEG) 20 Fr   days                Medications:    Scheduled Meds:  Current Facility-Administered Medications:  acetaminophen 650 mg Oral Q6H PRN Gaynel Leaks, CRNP    amiodarone 200 mg Oral Daily Gaynel Leaks, CRNP    b complex-vitamin C-folic acid 1 capsule Oral Daily Gaynel Leaks, CRNP    cholestyramine sugar free 4 g Oral BID Cloteal Jacob, CRNP    cinacalcet 30 mg Oral HS Cloteal Jacob, CRNP    citalopram 10 mg Oral Daily Cloteal Jacob, CRMARCELINO    diphenoxylate-atropine 2 tablet Oral 4x Daily Triston Devries MD    gabapentin 200 mg Oral HS Yazmin Caldwell MD    insulin detemir 10 Units Subcutaneous BID Triston Devries MD    insulin lispro 1-5 Units Subcutaneous HS Cloteal Jacob, CRNP    insulin lispro 1-6 Units Subcutaneous TID AC Cloteal Jacob, CRNP    insulin lispro 4 Units Subcutaneous TID AC Triston Devries MD    iron sucrose 100 mg Intravenous After Dialysis Yamzin Caldwell MD Last Rate: Stopped (02/04/18 2023)   levothyroxine 137 mcg Oral Early Morning Cloteal Jacob, CRMARCELINO    melatonin 3 mg Oral HS Triston Devries MD    meropenem 500 mg Intravenous Q24H Triston Devries MD Last Rate: 500 mg (02/04/18 1234)   metoprolol tartrate 12 5 mg Oral Q12H [facility] Yazmin Caldwell MD    nystatin  Topical BID Cloteal Jacob, CRMARCELINO    traMADol 50 mg Oral Q6H PRN Cloteal Jacob, CRMARCELINO    vancomycin 125 mg Oral Daily Cloteal Jacob, CRMARCELINO    warfarin 6 mg Oral Daily (warfarin) ALIE Gooden        PRN Meds:   acetaminophen    iron sucrose    traMADol    Continuous Infusions:         LAB RESULTS:        Results from last 7 days  Lab Units 02/04/18  0513 02/03/18  0543 02/02/18  0617 02/01/18  0448 01/31/18  0449 01/30/18  1038   WBC Thousand/uL 7 79 8 51 9 84 10 95* 12 39* 15 10*   HEMOGLOBIN g/dL 10 3* 10 5* 9 8* 9 5* 9 6* 12 1   HEMATOCRIT % 31 7* 31 6* 29 0* 29 0* 29 0* 36 9   PLATELETS Thousands/uL 197 199 179 183 178 213   NEUTROS PCT %  --  70 69 73  --   --    LYMPHS PCT %  --  13* 15 9*  --   --    LYMPHO PCT %  --   --   --   --   --  1*   MONOS PCT %  --  10 10 14*  --   --    MONO PCT MAN %  --   --   --   --   --  6   EOS PCT %  --  6 5 4  --   --    EOSINO PCT MANUAL %  --   --   --   --   --  2   SODIUM mmol/L 130* 128*  --  129* 127* 133*   POTASSIUM mmol/L 5 0 4 5  --  3 7 4 1 3 7 CHLORIDE mmol/L 94* 93*  --  92* 94* 97*   CO2 mmol/L 24 25  --  27 24 26   BUN mg/dL 58* 39*  --  28* 50* 32*   CREATININE mg/dL 6 02* 4 45*  --  4 04* 5 76* 4 65*   CALCIUM mg/dL 7 6* 7 4*  --  7 6* 7 5* 8 0*   TOTAL PROTEIN g/dL  --   --   --   --   --  9 1*   BILIRUBIN TOTAL mg/dL  --   --   --   --   --  0 38   ALK PHOS U/L  --   --   --   --   --  185*   ALT U/L  --   --   --   --   --  25   AST U/L  --   --   --   --   --  29   GLUCOSE RANDOM mg/dL 89 193*  --  94 212* 131       CUTURES:  Lab Results   Component Value Date    BLOODCX No Growth After 5 Days  01/30/2018    BLOODCX No Growth After 5 Days  01/30/2018    BLOODCX No Growth After 5 Days  11/29/2017    BLOODCX No Growth After 5 Days  11/29/2017    BLOODCX No Growth After 5 Days  09/17/2017    BLOODCX No Growth After 5 Days  09/17/2017    BLOODCX No Growth After 5 Days  08/03/2017    URINECX >100,000 cfu/ml Pseudomonas fluorescens/putida (A) 01/30/2018    URINECX >100,000 cfu/ml Pseudomonas aeruginosa (A) 11/27/2017    URINECX >100,000 cfu/ml Pseudomonas aeruginosa 09/17/2017    URINECX  09/17/2017     6413-1063 cfu/ml Oxidase Positive gram negative jose alfredo    URINECX >100,000 cfu/ml Pseudomonas aeruginosa 06/16/2017    URINECX No Growth <1000 cfu/mL 09/10/2016                 Portions of the record may have been created with voice recognition software  Occasional wrong word or "sound a like" substitutions may have occurred due to the inherent limitations of voice recognition software  Read the chart carefully and recognize, using context, where substitutions have occurred  If you have any questions, please contact the dictating provider  (4) completely dependent

## 2018-04-29 LAB
HCT VFR BLD CALC: 22.1 % — LOW (ref 39–50)
HGB BLD-MCNC: 7.1 G/DL — LOW (ref 13–17)
MCHC RBC-ENTMCNC: 27.4 PG — SIGNIFICANT CHANGE UP (ref 27–34)
MCHC RBC-ENTMCNC: 32.1 GM/DL — SIGNIFICANT CHANGE UP (ref 32–36)
MCV RBC AUTO: 85.3 FL — SIGNIFICANT CHANGE UP (ref 80–100)
NRBC # BLD: 0 /100 WBCS — SIGNIFICANT CHANGE UP (ref 0–0)
PLATELET # BLD AUTO: 215 K/UL — SIGNIFICANT CHANGE UP (ref 150–400)
RBC # BLD: 2.59 M/UL — LOW (ref 4.2–5.8)
RBC # FLD: 17.4 % — HIGH (ref 10.3–14.5)
WBC # BLD: 7.29 K/UL — SIGNIFICANT CHANGE UP (ref 3.8–10.5)
WBC # FLD AUTO: 7.29 K/UL — SIGNIFICANT CHANGE UP (ref 3.8–10.5)

## 2018-04-29 RX ORDER — FUROSEMIDE 40 MG
40 TABLET ORAL ONCE
Qty: 0 | Refills: 0 | Status: COMPLETED | OUTPATIENT
Start: 2018-04-29 | End: 2018-04-29

## 2018-04-29 RX ADMIN — Medication 8 MILLIGRAM(S): at 14:44

## 2018-04-29 RX ADMIN — Medication 8 MILLIGRAM(S): at 05:20

## 2018-04-29 RX ADMIN — SODIUM CHLORIDE 125 MILLILITER(S): 9 INJECTION INTRAMUSCULAR; INTRAVENOUS; SUBCUTANEOUS at 22:10

## 2018-04-29 RX ADMIN — SENNA PLUS 2 TABLET(S): 8.6 TABLET ORAL at 22:11

## 2018-04-29 RX ADMIN — HEPARIN SODIUM 5000 UNIT(S): 5000 INJECTION INTRAVENOUS; SUBCUTANEOUS at 17:31

## 2018-04-29 RX ADMIN — Medication 40 MILLIGRAM(S): at 09:40

## 2018-04-29 RX ADMIN — Medication 8 MILLIGRAM(S): at 22:11

## 2018-04-29 RX ADMIN — HEPARIN SODIUM 5000 UNIT(S): 5000 INJECTION INTRAVENOUS; SUBCUTANEOUS at 05:19

## 2018-04-30 LAB
ABO RH CONFIRMATION: SIGNIFICANT CHANGE UP
ABO RH CONFIRMATION: SIGNIFICANT CHANGE UP
ANION GAP SERPL CALC-SCNC: 7 MMOL/L — SIGNIFICANT CHANGE UP (ref 5–17)
ANION GAP SERPL CALC-SCNC: 8 MMOL/L — SIGNIFICANT CHANGE UP (ref 5–17)
BLD GP AB SCN SERPL QL: SIGNIFICANT CHANGE UP
BUN SERPL-MCNC: 49 MG/DL — HIGH (ref 7–23)
BUN SERPL-MCNC: 49 MG/DL — HIGH (ref 7–23)
CALCIUM SERPL-MCNC: 10.5 MG/DL — HIGH (ref 8.5–10.1)
CALCIUM SERPL-MCNC: 10.7 MG/DL — HIGH (ref 8.5–10.1)
CHLORIDE SERPL-SCNC: 102 MMOL/L — SIGNIFICANT CHANGE UP (ref 96–108)
CHLORIDE SERPL-SCNC: 103 MMOL/L — SIGNIFICANT CHANGE UP (ref 96–108)
CO2 SERPL-SCNC: 25 MMOL/L — SIGNIFICANT CHANGE UP (ref 22–31)
CO2 SERPL-SCNC: 27 MMOL/L — SIGNIFICANT CHANGE UP (ref 22–31)
CREAT SERPL-MCNC: 1.46 MG/DL — HIGH (ref 0.5–1.3)
CREAT SERPL-MCNC: 1.75 MG/DL — HIGH (ref 0.5–1.3)
GLUCOSE SERPL-MCNC: 136 MG/DL — HIGH (ref 70–99)
GLUCOSE SERPL-MCNC: 153 MG/DL — HIGH (ref 70–99)
HCT VFR BLD CALC: 21.7 % — LOW (ref 39–50)
HCT VFR BLD CALC: 23.8 % — LOW (ref 39–50)
HGB BLD-MCNC: 7.1 G/DL — LOW (ref 13–17)
HGB BLD-MCNC: 7.8 G/DL — LOW (ref 13–17)
MCHC RBC-ENTMCNC: 28.1 PG — SIGNIFICANT CHANGE UP (ref 27–34)
MCHC RBC-ENTMCNC: 32.8 GM/DL — SIGNIFICANT CHANGE UP (ref 32–36)
MCV RBC AUTO: 85.6 FL — SIGNIFICANT CHANGE UP (ref 80–100)
NRBC # BLD: 0 /100 WBCS — SIGNIFICANT CHANGE UP (ref 0–0)
PLATELET # BLD AUTO: 219 K/UL — SIGNIFICANT CHANGE UP (ref 150–400)
POTASSIUM SERPL-MCNC: 3.3 MMOL/L — LOW (ref 3.5–5.3)
POTASSIUM SERPL-MCNC: 3.7 MMOL/L — SIGNIFICANT CHANGE UP (ref 3.5–5.3)
POTASSIUM SERPL-SCNC: 3.3 MMOL/L — LOW (ref 3.5–5.3)
POTASSIUM SERPL-SCNC: 3.7 MMOL/L — SIGNIFICANT CHANGE UP (ref 3.5–5.3)
RBC # BLD: 2.78 M/UL — LOW (ref 4.2–5.8)
RBC # FLD: 17.2 % — HIGH (ref 10.3–14.5)
SODIUM SERPL-SCNC: 136 MMOL/L — SIGNIFICANT CHANGE UP (ref 135–145)
SODIUM SERPL-SCNC: 136 MMOL/L — SIGNIFICANT CHANGE UP (ref 135–145)
WBC # BLD: 6.32 K/UL — SIGNIFICANT CHANGE UP (ref 3.8–10.5)
WBC # FLD AUTO: 6.32 K/UL — SIGNIFICANT CHANGE UP (ref 3.8–10.5)

## 2018-04-30 RX ORDER — POTASSIUM CHLORIDE 20 MEQ
40 PACKET (EA) ORAL ONCE
Qty: 0 | Refills: 0 | Status: COMPLETED | OUTPATIENT
Start: 2018-04-30 | End: 2018-04-30

## 2018-04-30 RX ORDER — SODIUM CHLORIDE 9 MG/ML
1000 INJECTION INTRAMUSCULAR; INTRAVENOUS; SUBCUTANEOUS
Qty: 0 | Refills: 0 | Status: DISCONTINUED | OUTPATIENT
Start: 2018-04-30 | End: 2018-05-01

## 2018-04-30 RX ADMIN — SODIUM CHLORIDE 125 MILLILITER(S): 9 INJECTION INTRAMUSCULAR; INTRAVENOUS; SUBCUTANEOUS at 06:16

## 2018-04-30 RX ADMIN — SODIUM CHLORIDE 80 MILLILITER(S): 9 INJECTION INTRAMUSCULAR; INTRAVENOUS; SUBCUTANEOUS at 09:22

## 2018-04-30 RX ADMIN — Medication 8 MILLIGRAM(S): at 06:16

## 2018-04-30 RX ADMIN — HEPARIN SODIUM 5000 UNIT(S): 5000 INJECTION INTRAVENOUS; SUBCUTANEOUS at 17:25

## 2018-04-30 RX ADMIN — Medication 8 MILLIGRAM(S): at 22:09

## 2018-04-30 RX ADMIN — SENNA PLUS 2 TABLET(S): 8.6 TABLET ORAL at 22:09

## 2018-04-30 RX ADMIN — Medication 8 MILLIGRAM(S): at 17:25

## 2018-04-30 RX ADMIN — HEPARIN SODIUM 5000 UNIT(S): 5000 INJECTION INTRAVENOUS; SUBCUTANEOUS at 06:16

## 2018-04-30 RX ADMIN — Medication 40 MILLIEQUIVALENT(S): at 03:07

## 2018-04-30 NOTE — PROGRESS NOTE ADULT - ASSESSMENT
Multiple myeloma with hypercalcemia, dehydration ad BOY, DNR status..  serum calcium trending down with hydration,  BOY improving.
89 male with a history of right pelvic pain and found to have a fracture. He also has a history of multiple myeloma and now with BOY on CKD with hypercalcemia. Family does not want any aggressive treatments. may need aridia if family agrees. Will continue hydration and keep him on comfort care. spoke to family at bed side. will follow closely.
Hypercalcemia, improving  dehydration, corrected.  Multiple myeloma  anemia of malignancy  BOY- improved    Plan check labs in am and possible dischrge tomorrow
improved. Monitor calcium levels. labs pending from today.

## 2018-05-01 ENCOUNTER — TRANSCRIPTION ENCOUNTER (OUTPATIENT)
Age: 83
End: 2018-05-01

## 2018-05-01 ENCOUNTER — OUTPATIENT (OUTPATIENT)
Dept: OUTPATIENT SERVICES | Facility: HOSPITAL | Age: 83
LOS: 1 days | End: 2018-05-01
Payer: MEDICAID

## 2018-05-01 VITALS
RESPIRATION RATE: 18 BRPM | HEART RATE: 56 BPM | TEMPERATURE: 97 F | OXYGEN SATURATION: 97 % | SYSTOLIC BLOOD PRESSURE: 136 MMHG | DIASTOLIC BLOOD PRESSURE: 67 MMHG

## 2018-05-01 LAB
ANION GAP SERPL CALC-SCNC: 8 MMOL/L — SIGNIFICANT CHANGE UP (ref 5–17)
BUN SERPL-MCNC: 40 MG/DL — HIGH (ref 7–23)
CALCIUM SERPL-MCNC: 10 MG/DL — SIGNIFICANT CHANGE UP (ref 8.5–10.1)
CHLORIDE SERPL-SCNC: 105 MMOL/L — SIGNIFICANT CHANGE UP (ref 96–108)
CO2 SERPL-SCNC: 23 MMOL/L — SIGNIFICANT CHANGE UP (ref 22–31)
CREAT SERPL-MCNC: 1.12 MG/DL — SIGNIFICANT CHANGE UP (ref 0.5–1.3)
GLUCOSE SERPL-MCNC: 125 MG/DL — HIGH (ref 70–99)
HCT VFR BLD CALC: 23.1 % — LOW (ref 39–50)
HGB BLD-MCNC: 7.6 G/DL — LOW (ref 13–17)
MCHC RBC-ENTMCNC: 27.5 PG — SIGNIFICANT CHANGE UP (ref 27–34)
MCHC RBC-ENTMCNC: 32.9 GM/DL — SIGNIFICANT CHANGE UP (ref 32–36)
MCV RBC AUTO: 83.7 FL — SIGNIFICANT CHANGE UP (ref 80–100)
NRBC # BLD: 0 /100 WBCS — SIGNIFICANT CHANGE UP (ref 0–0)
PLATELET # BLD AUTO: 214 K/UL — SIGNIFICANT CHANGE UP (ref 150–400)
POTASSIUM SERPL-MCNC: 3.8 MMOL/L — SIGNIFICANT CHANGE UP (ref 3.5–5.3)
POTASSIUM SERPL-SCNC: 3.8 MMOL/L — SIGNIFICANT CHANGE UP (ref 3.5–5.3)
RBC # BLD: 2.76 M/UL — LOW (ref 4.2–5.8)
RBC # FLD: 17.2 % — HIGH (ref 10.3–14.5)
SODIUM SERPL-SCNC: 136 MMOL/L — SIGNIFICANT CHANGE UP (ref 135–145)
WBC # BLD: 5.03 K/UL — SIGNIFICANT CHANGE UP (ref 3.8–10.5)
WBC # FLD AUTO: 5.03 K/UL — SIGNIFICANT CHANGE UP (ref 3.8–10.5)

## 2018-05-01 PROCEDURE — G9001: CPT

## 2018-05-01 RX ORDER — HEPARIN SODIUM 5000 [USP'U]/ML
5000 INJECTION INTRAVENOUS; SUBCUTANEOUS
Qty: 0 | Refills: 0 | COMMUNITY

## 2018-05-01 RX ADMIN — Medication 8 MILLIGRAM(S): at 05:58

## 2018-05-01 RX ADMIN — HEPARIN SODIUM 5000 UNIT(S): 5000 INJECTION INTRAVENOUS; SUBCUTANEOUS at 05:58

## 2018-05-01 RX ADMIN — Medication 8 MILLIGRAM(S): at 14:20

## 2018-05-01 RX ADMIN — HEPARIN SODIUM 5000 UNIT(S): 5000 INJECTION INTRAVENOUS; SUBCUTANEOUS at 17:21

## 2018-05-01 RX ADMIN — SODIUM CHLORIDE 80 MILLILITER(S): 9 INJECTION INTRAMUSCULAR; INTRAVENOUS; SUBCUTANEOUS at 05:57

## 2018-05-01 NOTE — DISCHARGE NOTE ADULT - PLAN OF CARE
resolved patient should be drinking at least 2 liters of fluid daily., take meds as prescribed. follow up with dr Mayen at [facility] for his myeloma. corrected chronic. manage MM follow up with [facility] clinic in 1 week

## 2018-05-01 NOTE — DISCHARGE NOTE ADULT - CARE PLAN
Principal Discharge DX:	Hypercalcemia  Goal:	resolved  Assessment and plan of treatment:	patient should be drinking at least 2 liters of fluid daily., take meds as prescribed. follow up with dr Mayen at [facility] for his myeloma.  Secondary Diagnosis:	Dehydration  Goal:	corrected  Secondary Diagnosis:	Anemia in neoplastic disease  Goal:	chronic.  Assessment and plan of treatment:	manage MM  Secondary Diagnosis:	BOY (acute kidney injury)  Goal:	resolved  Secondary Diagnosis:	Multiple myeloma, failed remission  Assessment and plan of treatment:	follow up with [facility] clinic in 1 week

## 2018-05-01 NOTE — DISCHARGE NOTE ADULT - PROVIDER TOKENS
FREE:[LAST:[Dr Mayen],PHONE:[(   )    -],FAX:[(   )    -],ADDRESS:[[facility] in1 week]]

## 2018-05-01 NOTE — DISCHARGE NOTE ADULT - PATIENT PORTAL LINK FT
You can access the [facility], offered by [facility], by registering with the following website: [email or web address]

## 2018-05-01 NOTE — DISCHARGE NOTE ADULT - SECONDARY DIAGNOSIS.
Dehydration Anemia in neoplastic disease BOY (acute kidney injury) Multiple myeloma, failed remission

## 2018-05-01 NOTE — DISCHARGE NOTE ADULT - CARE PROVIDER_API CALL
Dr Mayen,   [facility] in1 week  Phone: (   )    -  Fax: (   )    -

## 2018-05-01 NOTE — DISCHARGE NOTE ADULT - HOSPITAL COURSE
patient was sent from [facility] because of evated Ca level. He has Multiple myeloma in failed remission state and has previous hx of hypercalcemia . Patient ha Ca level of 14.2 on arival. He was dehydrated and had BOY. patient waqs hydrated and given lasix and strted on decadron. His calcium level came down an he is hemodynamically stable. his daughter was on board treament at all timed and wanted his follow up care at [facility], where he followed for his MM.. he was evaluated by PT prior to dischrge

## 2018-05-01 NOTE — PROGRESS NOTE ADULT - SUBJECTIVE AND OBJECTIVE BOX
Patient feels well no complaints today.     MEDICATIONS  (STANDING):  dexamethasone     Tablet 8 milliGRAM(s) Oral three times a day  heparin  Injectable 5000 Unit(s) SubCutaneous every 12 hours  senna 2 Tablet(s) Oral at bedtime  sodium chloride 0.9%. 1000 milliLiter(s) (80 mL/Hr) IV Continuous <Continuous>    MEDICATIONS  (PRN):  magnesium hydroxide Suspension 30 milliLiter(s) Oral daily PRN Constipation  oxyCODONE    5 mG/acetaminophen 325 mG 1 Tablet(s) Oral every 4 hours PRN Moderate Pain (4 - 6)      04-29-18 @ 07:01  -  04-30-18 @ 07:00  --------------------------------------------------------  IN: 2560 mL / OUT: 500 mL / NET: 2060 mL      PHYSICAL EXAM:      T(C): 36.2 (04-30-18 @ 12:40), Max: 36.7 (04-29-18 @ 17:17)  HR: 45 (04-30-18 @ 12:40) (45 - 75)  BP: 135/62 (04-30-18 @ 12:40) (109/69 - 138/63)  RR: 18 (04-30-18 @ 12:40) (18 - 18)  SpO2: 100% (04-30-18 @ 12:40) (98% - 100%)  Wt(kg): --  Respiratory: clear anteriorly, decreased BS at bases  Cardiovascular: S1 S2  Gastrointestinal: soft NT ND +BS  Extremities:   tr edema                                    7.8    6.32  )-----------( 219      ( 30 Apr 2018 08:21 )             23.8     04-30    136  |  103  |  49<H>  ----------------------------<  136<H>  3.7   |  25  |  1.46<H>    Ca    10.7<H>      30 Apr 2018 08:21            Assessment and Plan:  BOY, hypercalcemia, MM; improving with IVF;  Continue current rx;   Will follow.
Patient feels well no complaints today.    MEDICATIONS  (STANDING):  dexamethasone     Tablet 8 milliGRAM(s) Oral three times a day  heparin  Injectable 5000 Unit(s) SubCutaneous every 12 hours  senna 2 Tablet(s) Oral at bedtime  sodium chloride 0.9%. 1000 milliLiter(s) (80 mL/Hr) IV Continuous <Continuous>    MEDICATIONS  (PRN):  magnesium hydroxide Suspension 30 milliLiter(s) Oral daily PRN Constipation  oxyCODONE    5 mG/acetaminophen 325 mG 1 Tablet(s) Oral every 4 hours PRN Moderate Pain (4 - 6)      04-30-18 @ 07:01  -  05-01-18 @ 07:00  --------------------------------------------------------  IN: 2310 mL / OUT: 0 mL / NET: 2310 mL      PHYSICAL EXAM:      T(C): 35.8 (05-01-18 @ 12:01), Max: 36.4 (04-30-18 @ 18:08)  HR: 56 (05-01-18 @ 12:01) (54 - 58)  BP: 150/75 (05-01-18 @ 12:01) (118/61 - 162/71)  RR: 16 (05-01-18 @ 12:01) (16 - 18)  SpO2: 98% (05-01-18 @ 12:01) (96% - 98%)  Wt(kg): --  Respiratory: clear anteriorly, decreased BS at bases  Cardiovascular: S1 S2  Gastrointestinal: soft NT ND +BS  Extremities:   tr -1 edema                                    7.6    5.03  )-----------( 214      ( 01 May 2018 07:57 )             23.1     05-01    136  |  105  |  40<H>  ----------------------------<  125<H>  3.8   |  23  |  1.12    Ca    10.0      01 May 2018 07:57            Assessment and Plan:    BOY pre renal azotemia, improved;  Supportive care;
Patient is a 89y old  Male who presents with a chief complaint of Hypercalcemia, dehydration, BOY, mutiple Myeloma (27 Apr 2018 19:25)  Ca++ 10.7    INTERVAL HPI/OVERNIGHT EVENTS:uneventful    MEDICATIONS  (STANDING):  dexamethasone     Tablet 8 milliGRAM(s) Oral three times a day  heparin  Injectable 5000 Unit(s) SubCutaneous every 12 hours  senna 2 Tablet(s) Oral at bedtime  sodium chloride 0.9%. 1000 milliLiter(s) (80 mL/Hr) IV Continuous <Continuous>    MEDICATIONS  (PRN):  magnesium hydroxide Suspension 30 milliLiter(s) Oral daily PRN Constipation  oxyCODONE    5 mG/acetaminophen 325 mG 1 Tablet(s) Oral every 4 hours PRN Moderate Pain (4 - 6)      Allergies    No Known Allergies    Intolerances        REVIEW OF SYSTEMS:  CONSTITUTIONAL: No fever, weight loss, or fatigue  EYES: No eye pain, visual disturbances, or discharge  ENMT:  No difficulty hearing, tinnitus, vertigo; No sinus or throat pain  NECK: No pain or stiffness  BREASTS: No pain, masses, or nipple discharge  RESPIRATORY: No cough, wheezing, chills or hemoptysis; No shortness of breath  CARDIOVASCULAR: No chest pain, palpitations, dizziness, or leg swelling  GASTROINTESTINAL: No abdominal or epigastric pain. No nausea, vomiting, or hematemesis; No diarrhea or constipation. No melena or hematochezia.  GENITOURINARY: No dysuria, frequency, hematuria, or incontinence  NEUROLOGICAL: No headaches, memory loss, loss of strength, numbness, or tremors  SKIN: No itching, burning, rashes, or lesions   LYMPH NODES: No enlarged glands  ENDOCRINE: No heat or cold intolerance; No hair loss  MUSCULOSKELETAL: No joint pain or swelling; No muscle, back, or extremity pain  PSYCHIATRIC: No depression, anxiety, mood swings, or difficulty sleeping  HEME/LYMPH: No easy bruising, or bleeding gums  ALLERY AND IMMUNOLOGIC: No hives or eczema    Vital Signs Last 24 Hrs  T(C): 36.3 (30 Apr 2018 05:22), Max: 36.7 (29 Apr 2018 17:17)  T(F): 97.3 (30 Apr 2018 05:22), Max: 98 (29 Apr 2018 17:17)  HR: 55 (30 Apr 2018 05:22) (55 - 75)  BP: 138/63 (30 Apr 2018 05:22) (109/69 - 138/63)  BP(mean): --  RR: 18 (30 Apr 2018 05:22) (18 - 18)  SpO2: 100% (30 Apr 2018 05:22) (98% - 100%)    PHYSICAL EXAM:  GENERAL: NAD, well-groomed, well-developed  HEAD:  Atraumatic, Normocephalic  EYES: EOMI, PERRLA, conjunctiva and sclera clear  ENMT: No tonsillar erythema, exudates, or enlargement; Moist mucous membranes, Good dentition, No lesions  NECK: Supple, No JVD, Normal thyroid  NERVOUS SYSTEM:  Alert & Oriented X3, Good concentration; Motor Strength 5/5 B/L upper and lower extremities; DTRs 2+ intact and symmetric  CHEST/LUNG: Clear to percussion bilaterally; No rales, rhonchi, wheezing, or rubs  HEART: Regular rate and rhythm; No murmurs, rubs, or gallops  ABDOMEN: Soft, Nontender, Nondistended; Bowel sounds present  EXTREMITIES:  2+ Peripheral Pulses, No clubbing, cyanosis, or edema  LYMPH: No lymphadenopathy noted  SKIN: No rashes or lesions    LABS:                        7.8    6.32  )-----------( 219      ( 30 Apr 2018 08:21 )             23.8     04-30    136  |  103  |  49<H>  ----------------------------<  136<H>  3.7   |  25  |  1.46<H>    Ca    10.7<H>      30 Apr 2018 08:21            CAPILLARY BLOOD GLUCOSE                    RADIOLOGY & ADDITIONAL TESTS:    Imaging Personally Reviewed:  [ ] YES  [ ] NO    Consultant(s) Notes Reviewed:  [ ] YES  [ ] NO    Care Discussed with Consultants/Other Providers [ ] YES  [ ] NO    Care discussed with family,         [  ]   yes  [  ]  No    imp:    stable[ ]    unstable[  ]     improving [ x  ]       unchanged  [  ]                Plans:  Continue present plans  [ x ]               New consult [  ]   specialty  .......               order test[  ]    test name.                  Discharge Planning  [  ]
Patient is a 89y old  Male who presents with a chief complaint of Hypercalcemia, dehydration, BOY, mutiple Myeloma (27 Apr 2018 19:25)  improving with hydration.    INTERVAL HPI/OVERNIGHT EVENTS: no major or minor events    MEDICATIONS  (STANDING):  dexamethasone     Tablet 8 milliGRAM(s) Oral three times a day  heparin  Injectable 5000 Unit(s) SubCutaneous every 12 hours  senna 2 Tablet(s) Oral at bedtime  sodium chloride 0.9%. 1000 milliLiter(s) (125 mL/Hr) IV Continuous <Continuous>    MEDICATIONS  (PRN):  magnesium hydroxide Suspension 30 milliLiter(s) Oral daily PRN Constipation  oxyCODONE    5 mG/acetaminophen 325 mG 1 Tablet(s) Oral every 4 hours PRN Moderate Pain (4 - 6)      Allergies    No Known Allergies    Intolerances        REVIEW OF SYSTEMS:  CONSTITUTIONAL: No fever, weight loss, or fatigue  EYES: No eye pain, visual disturbances, or discharge  ENMT:  No difficulty hearing, tinnitus, vertigo; No sinus or throat pain  NECK: No pain or stiffness  BREASTS: No pain, masses, or nipple discharge  RESPIRATORY: No cough, wheezing, chills or hemoptysis; No shortness of breath  CARDIOVASCULAR: No chest pain, palpitations, dizziness, or leg swelling  GASTROINTESTINAL: No abdominal or epigastric pain. No nausea, vomiting, or hematemesis; No diarrhea or constipation. No melena or hematochezia.  GENITOURINARY: No dysuria, frequency, hematuria, or incontinence  NEUROLOGICAL: No headaches, memory loss, loss of strength, numbness, or tremors  SKIN: No itching, burning, rashes, or lesions   LYMPH NODES: No enlarged glands  ENDOCRINE: No heat or cold intolerance; No hair loss  MUSCULOSKELETAL: No joint pain or swelling; No muscle, back, or extremity pain  PSYCHIATRIC: No depression, anxiety, mood swings, or difficulty sleeping  HEME/LYMPH: No easy bruising, or bleeding gums  ALLERY AND IMMUNOLOGIC: No hives or eczema    Vital Signs Last 24 Hrs  T(C): 36.3 (29 Apr 2018 05:00), Max: 36.8 (28 Apr 2018 11:05)  T(F): 97.4 (29 Apr 2018 05:00), Max: 98.2 (28 Apr 2018 11:05)  HR: 78 (29 Apr 2018 05:00) (71 - 83)  BP: 103/74 (29 Apr 2018 05:00) (103/74 - 138/79)  BP(mean): --  RR: 18 (29 Apr 2018 05:00) (18 - 18)  SpO2: 96% (29 Apr 2018 05:00) (96% - 100%)    PHYSICAL EXAM:  GENERAL: NAD, well-groomed, well-developed  HEAD:  Atraumatic, Normocephalic  EYES: EOMI, PERRLA, conjunctiva and sclera clear  ENMT: No tonsillar erythema, exudates, or enlargement; Moist mucous membranes, Good dentition, No lesions  NECK: Supple, No JVD, Normal thyroid  NERVOUS SYSTEM:  Alert & Oriented X3, Good concentration; Motor Strength 5/5 B/L upper and lower extremities; DTRs 2+ intact and symmetric  CHEST/LUNG: Clear to percussion bilaterally; No rales, rhonchi, wheezing, or rubs  HEART: Regular rate and rhythm; No murmurs, rubs, or gallops  ABDOMEN: Soft, Nontender, Nondistended; Bowel sounds present  EXTREMITIES:  2+ Peripheral Pulses, No clubbing, cyanosis, or edema  LYMPH: No lymphadenopathy noted  SKIN: No rashes or lesions    LABS:                        8.7    4.57  )-----------( 248      ( 28 Apr 2018 07:33 )             27.7     04-28    141  |  107  |  46<H>  ----------------------------<  111<H>  4.0   |  30  |  2.08<H>    Ca    12.9<H>      28 Apr 2018 07:33    TPro  10.8<H>  /  Alb  3.1<L>  /  TBili  0.4  /  DBili  x   /  AST  70<H>  /  ALT  27  /  AlkPhos  69  04-27      PT/INR - ( 27 Apr 2018 17:12 )   PT: 12.2 sec;   INR: 1.12 ratio         PTT - ( 27 Apr 2018 17:12 )  PTT:24.8 sec    CAPILLARY BLOOD GLUCOSE                    RADIOLOGY & ADDITIONAL TESTS:    Imaging Personally Reviewed:  [ ] YES  [ ] NO    Consultant(s) Notes Reviewed:  [ ] YES  [ ] NO    Care Discussed with Consultants/Other Providers [ ] YES  [ ] NO    Care discussed with family,         [  ]   yes  [  ]  No    imp:    stable[ ]    unstable[  ]     improving [ x  ]       unchanged  [  ]                Plans:  Continue present plans  [x  ] hydration, lasix               New consult [  ]   specialty  .......               order test[  ]    test name.                  Discharge Planning  [  ]
TOBY SPENCER  89y  Male    Patient is a 89y old  Male who presents with a chief complaint of Hypercalcemia, dehydration, BOY, mutiple Myeloma (27 Apr 2018 19:25)      HPI:  89 yearold male, DNR status, from [facility], . sent to ED because of hypercalcemia.. Patient has hx of Multiple myeloma with bone involvement. Was admitted this month for Right iliac bone fx and Hypercalcemia and BOY.    lethargic but arousable.     PAST MEDICAL & SURGICAL HISTORY:  Multiple myeloma, failed remission  Essential hypertension  Melanoma  No significant past surgical history          PHYSICAL EXAM:    T(C): 36.8 (04-29-18 @ 10:54), Max: 36.8 (04-29-18 @ 10:54)  HR: 80 (04-29-18 @ 10:54) (77 - 83)  BP: 124/71 (04-29-18 @ 10:54) (103/74 - 138/79)  RR: 18 (04-29-18 @ 10:54) (18 - 18)  SpO2: 96% (04-29-18 @ 10:54) (96% - 100%)  Wt(kg): --    I&O's Detail    28 Apr 2018 07:01  -  29 Apr 2018 07:00  --------------------------------------------------------  IN:    Oral Fluid: 240 mL    sodium chloride 0.9%.: 1375 mL  Total IN: 1615 mL    OUT:  Total OUT: 0 mL    Total NET: 1615 mL      29 Apr 2018 07:01  -  29 Apr 2018 16:27  --------------------------------------------------------  IN:    Oral Fluid: 600 mL  Total IN: 600 mL    OUT:    Voided: 500 mL  Total OUT: 500 mL    Total NET: 100 mL          Respiratory: clear anteriorly, decreased BS at bases  Cardiovascular: S1 S2  Gastrointestinal: soft NT ND +BS  Extremities: edema   Neuro: Awake and alert    MEDICATIONS  (STANDING):  dexamethasone     Tablet 8 milliGRAM(s) Oral three times a day  heparin  Injectable 5000 Unit(s) SubCutaneous every 12 hours  senna 2 Tablet(s) Oral at bedtime  sodium chloride 0.9%. 1000 milliLiter(s) (125 mL/Hr) IV Continuous <Continuous>    MEDICATIONS  (PRN):  magnesium hydroxide Suspension 30 milliLiter(s) Oral daily PRN Constipation  oxyCODONE    5 mG/acetaminophen 325 mG 1 Tablet(s) Oral every 4 hours PRN Moderate Pain (4 - 6)                            8.7    4.57  )-----------( 248      ( 28 Apr 2018 07:33 )             27.7       04-28    141  |  107  |  46<H>  ----------------------------<  111<H>  4.0   |  30  |  2.08<H>    Ca    12.9<H>      28 Apr 2018 07:33    TPro  10.8<H>  /  Alb  3.1<L>  /  TBili  0.4  /  DBili  x   /  AST  70<H>  /  ALT  27  /  AlkPhos  69  04-27
Patient is a 89y old  Male who presents with a chief complaint of Hypercalcemia, dehydration, BOY, mutiple Myeloma (27 Apr 2018 19:25)  labs show improvement.     INTERVAL HPI/OVERNIGHT EVENTS:there is no deterioration in mental function.     MEDICATIONS  (STANDING):  dexamethasone     Tablet 8 milliGRAM(s) Oral three times a day  heparin  Injectable 5000 Unit(s) SubCutaneous every 12 hours  senna 2 Tablet(s) Oral at bedtime  sodium chloride 0.9%. 1000 milliLiter(s) (125 mL/Hr) IV Continuous <Continuous>    MEDICATIONS  (PRN):  magnesium hydroxide Suspension 30 milliLiter(s) Oral daily PRN Constipation  oxyCODONE    5 mG/acetaminophen 325 mG 1 Tablet(s) Oral every 4 hours PRN Moderate Pain (4 - 6)      Allergies    No Known Allergies    Intolerances        REVIEW OF SYSTEMS:  CONSTITUTIONAL: No fever, weight loss, or fatigue  EYES: No eye pain, visual disturbances, or discharge  ENMT:  No difficulty hearing, tinnitus, vertigo; No sinus or throat pain  NECK: No pain or stiffness  BREASTS: No pain, masses, or nipple discharge  RESPIRATORY: No cough, wheezing, chills or hemoptysis; No shortness of breath  CARDIOVASCULAR: No chest pain, palpitations, dizziness, or leg swelling  GASTROINTESTINAL: No abdominal or epigastric pain. No nausea, vomiting, or hematemesis; No diarrhea or constipation. No melena or hematochezia.  GENITOURINARY: No dysuria, frequency, hematuria, or incontinence  NEUROLOGICAL: No headaches, memory loss, loss of strength, numbness, or tremors  SKIN: No itching, burning, rashes, or lesions   LYMPH NODES: No enlarged glands  ENDOCRINE: No heat or cold intolerance; No hair loss  MUSCULOSKELETAL: No joint pain or swelling; No muscle, back, or extremity pain  PSYCHIATRIC: No depression, anxiety, mood swings, or difficulty sleeping  HEME/LYMPH: No easy bruising, or bleeding gums  ALLERY AND IMMUNOLOGIC: No hives or eczema    Vital Signs Last 24 Hrs  T(C): 36.1 (28 Apr 2018 05:07), Max: 36.9 (27 Apr 2018 14:10)  T(F): 97 (28 Apr 2018 05:07), Max: 98.4 (27 Apr 2018 14:10)  HR: 70 (28 Apr 2018 05:07) (70 - 85)  BP: 124/73 (28 Apr 2018 05:07) (106/65 - 134/79)  BP(mean): --  RR: 18 (28 Apr 2018 05:07) (16 - 18)  SpO2: 96% (28 Apr 2018 05:07) (95% - 97%)    PHYSICAL EXAM:  GENERAL: NAD, well-groomed, well-developed  HEAD:  Atraumatic, Normocephalic  EYES: EOMI, PERRLA, conjunctiva and sclera clear  ENMT: No tonsillar erythema, exudates, or enlargement; Moist mucous membranes, Good dentition, No lesions  NECK: Supple, No JVD, Normal thyroid  NERVOUS SYSTEM:  Alert & Oriented X3, Good concentration; Motor Strength 5/5 B/L upper and lower extremities; DTRs 2+ intact and symmetric  CHEST/LUNG: Clear to percussion bilaterally; No rales, rhonchi, wheezing, or rubs  HEART: Regular rate and rhythm; No murmurs, rubs, or gallops  ABDOMEN: Soft, Nontender, Nondistended; Bowel sounds present  EXTREMITIES:  2+ Peripheral Pulses, No clubbing, cyanosis, or edema  LYMPH: No lymphadenopathy noted  SKIN: No rashes or lesions    LABS:                        8.7    4.57  )-----------( 248      ( 28 Apr 2018 07:33 )             27.7     04-28    141  |  107  |  46<H>  ----------------------------<  111<H>  4.0   |  30  |  2.08<H>    Ca    12.9<H>      28 Apr 2018 07:33    TPro  10.8<H>  /  Alb  3.1<L>  /  TBili  0.4  /  DBili  x   /  AST  70<H>  /  ALT  27  /  AlkPhos  69  04-27      PT/INR - ( 27 Apr 2018 17:12 )   PT: 12.2 sec;   INR: 1.12 ratio         PTT - ( 27 Apr 2018 17:12 )  PTT:24.8 sec    CAPILLARY BLOOD GLUCOSE                    RADIOLOGY & ADDITIONAL TESTS:    Imaging Personally Reviewed:  [ ] YES  [ ] NO    Consultant(s) Notes Reviewed:  [ ] YES  [ ] NO    Care Discussed with Consultants/Other Providers [ ] YES  [ ] NO    Care discussed with family,         [  ]   yes  [  ]  No    imp:    stable[ ]    unstable[  ]     improving [ x  ]       unchanged  [  ]                Plans:  Continue present plans  [x  ] continue hydration               New consult [  ]   specialty  .......               order test[  ]    test name. bmp in am, cbc in am                 Discharge Planning  [  ]

## 2018-05-01 NOTE — DISCHARGE NOTE ADULT - MEDICATION SUMMARY - MEDICATIONS TO TAKE
I will START or STAY ON the medications listed below when I get home from the hospital:    Decadron 4 mg oral tablet  -- 1 tab(s) by mouth 2 times a day  -- Indication: For for hypercalcemia of MM    oxyCODONE-acetaminophen 5 mg-325 mg oral tablet  -- 1 tab(s) by mouth every 4 hours, As needed, Moderate Pain (4 - 6)  -- Indication: For pain as needed    Calcium 600+D 600 mg-200 intl units oral tablet  -- 1 tab(s) by mouth 3 times a day   -- Indication: For supplement I will START or STAY ON the medications listed below when I get home from the hospital:    Decadron 4 mg oral tablet  -- 1 tab(s) by mouth 2 times a day  -- Indication: For for hypercalcemia of MM    oxyCODONE-acetaminophen 5 mg-325 mg oral tablet  -- 1 tab(s) by mouth every 4 hours, As needed, Moderate Pain (4 - 6)  -- Indication: For pain as needed

## 2018-05-01 NOTE — DISCHARGE NOTE ADULT - MEDICATION SUMMARY - MEDICATIONS TO STOP TAKING
I will STOP taking the medications listed below when I get home from the hospital:    heparin 5000 units/0.5 mL injectable solution  -- 5000 unit(s) subcutaneous 3 times a day for dvt prophylaxis I will STOP taking the medications listed below when I get home from the hospital:    heparin 5000 units/0.5 mL injectable solution  -- 5000 unit(s) subcutaneous 3 times a day for dvt prophylaxis    Calcium 600+D 600 mg-200 intl units oral tablet  -- 1 tab(s) by mouth 3 times a day

## 2018-05-03 DIAGNOSIS — E83.52 HYPERCALCEMIA: ICD-10-CM

## 2018-05-03 DIAGNOSIS — D63.0 ANEMIA IN NEOPLASTIC DISEASE: ICD-10-CM

## 2018-05-03 DIAGNOSIS — I10 ESSENTIAL (PRIMARY) HYPERTENSION: ICD-10-CM

## 2018-05-03 DIAGNOSIS — E86.0 DEHYDRATION: ICD-10-CM

## 2018-05-03 DIAGNOSIS — Z79.891 LONG TERM (CURRENT) USE OF OPIATE ANALGESIC: ICD-10-CM

## 2018-05-03 DIAGNOSIS — C90.00 MULTIPLE MYELOMA NOT HAVING ACHIEVED REMISSION: ICD-10-CM

## 2018-05-03 DIAGNOSIS — Z66 DO NOT RESUSCITATE: ICD-10-CM

## 2018-05-03 DIAGNOSIS — Z79.01 LONG TERM (CURRENT) USE OF ANTICOAGULANTS: ICD-10-CM

## 2018-05-03 DIAGNOSIS — N17.9 ACUTE KIDNEY FAILURE, UNSPECIFIED: ICD-10-CM

## 2018-05-09 DIAGNOSIS — R69 ILLNESS, UNSPECIFIED: ICD-10-CM

## 2018-05-24 ENCOUNTER — APPOINTMENT (OUTPATIENT)
Dept: ORTHOPEDIC SURGERY | Facility: CLINIC | Age: 83
End: 2018-05-24
Payer: COMMERCIAL

## 2018-05-25 ENCOUNTER — APPOINTMENT (OUTPATIENT)
Dept: ORTHOPEDIC SURGERY | Facility: CLINIC | Age: 83
End: 2018-05-25
Payer: COMMERCIAL

## 2018-05-25 VITALS
HEIGHT: 67 IN | WEIGHT: 140 LBS | SYSTOLIC BLOOD PRESSURE: 137 MMHG | BODY MASS INDEX: 21.97 KG/M2 | HEART RATE: 76 BPM | DIASTOLIC BLOOD PRESSURE: 78 MMHG

## 2018-05-25 DIAGNOSIS — Z78.9 OTHER SPECIFIED HEALTH STATUS: ICD-10-CM

## 2018-05-25 DIAGNOSIS — Z60.2 PROBLEMS RELATED TO LIVING ALONE: ICD-10-CM

## 2018-05-25 DIAGNOSIS — Z87.891 PERSONAL HISTORY OF NICOTINE DEPENDENCE: ICD-10-CM

## 2018-05-25 DIAGNOSIS — Z56.0 UNEMPLOYMENT, UNSPECIFIED: ICD-10-CM

## 2018-05-25 DIAGNOSIS — S32.309D: ICD-10-CM

## 2018-05-25 DIAGNOSIS — Z86.79 PERSONAL HISTORY OF OTHER DISEASES OF THE CIRCULATORY SYSTEM: ICD-10-CM

## 2018-05-25 DIAGNOSIS — C79.9 SECONDARY MALIGNANT NEOPLASM OF UNSPECIFIED SITE: ICD-10-CM

## 2018-05-25 PROCEDURE — 99203 OFFICE O/P NEW LOW 30 MIN: CPT

## 2018-05-25 PROCEDURE — 72190 X-RAY EXAM OF PELVIS: CPT

## 2018-05-25 SDOH — SOCIAL STABILITY - SOCIAL INSECURITY: PROBLEMS RELATED TO LIVING ALONE: Z60.2

## 2018-05-25 SDOH — ECONOMIC STABILITY - INCOME SECURITY: UNEMPLOYMENT, UNSPECIFIED: Z56.0

## 2018-05-30 ENCOUNTER — EMERGENCY (EMERGENCY)
Facility: HOSPITAL | Age: 83
LOS: 0 days | Discharge: HOME | End: 2018-05-30
Attending: EMERGENCY MEDICINE
Payer: MEDICARE

## 2018-05-30 VITALS
DIASTOLIC BLOOD PRESSURE: 93 MMHG | HEART RATE: 88 BPM | RESPIRATION RATE: 18 BRPM | OXYGEN SATURATION: 100 % | SYSTOLIC BLOOD PRESSURE: 157 MMHG | TEMPERATURE: 98 F

## 2018-05-30 VITALS
DIASTOLIC BLOOD PRESSURE: 92 MMHG | RESPIRATION RATE: 17 BRPM | HEART RATE: 82 BPM | WEIGHT: 130.07 LBS | OXYGEN SATURATION: 99 % | TEMPERATURE: 98 F | SYSTOLIC BLOOD PRESSURE: 154 MMHG | HEIGHT: 66 IN

## 2018-05-30 DIAGNOSIS — M25.572 PAIN IN LEFT ANKLE AND JOINTS OF LEFT FOOT: ICD-10-CM

## 2018-05-30 DIAGNOSIS — I10 ESSENTIAL (PRIMARY) HYPERTENSION: ICD-10-CM

## 2018-05-30 DIAGNOSIS — M25.522 PAIN IN LEFT ELBOW: ICD-10-CM
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
## 2018-05-30 DIAGNOSIS — S42.392A OTHER FRACTURE OF SHAFT OF LEFT HUMERUS, INITIAL ENCOUNTER FOR CLOSED FRACTURE: ICD-10-CM

## 2018-05-30 DIAGNOSIS — C90.00 MULTIPLE MYELOMA NOT HAVING ACHIEVED REMISSION: ICD-10-CM

## 2018-05-30 DIAGNOSIS — C43.9 MALIGNANT MELANOMA OF SKIN, UNSPECIFIED: ICD-10-CM
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
## 2018-05-30 LAB
ALBUMIN SERPL ELPH-MCNC: 1.9 G/DL — LOW (ref 3.3–5)
ALP SERPL-CCNC: 73 U/L — SIGNIFICANT CHANGE UP (ref 40–120)
ALT FLD-CCNC: 30 U/L — SIGNIFICANT CHANGE UP (ref 12–78)
ANION GAP SERPL CALC-SCNC: 8 MMOL/L — SIGNIFICANT CHANGE UP (ref 5–17)
ANISOCYTOSIS BLD QL: SLIGHT — SIGNIFICANT CHANGE UP
APTT BLD: 21.4 SEC — LOW (ref 27.5–37.4)
AST SERPL-CCNC: 27 U/L — SIGNIFICANT CHANGE UP (ref 15–37)
BASOPHILS # BLD AUTO: 0 K/UL — SIGNIFICANT CHANGE UP (ref 0–0.2)
BASOPHILS NFR BLD AUTO: 0 % — SIGNIFICANT CHANGE UP (ref 0–2)
BILIRUB SERPL-MCNC: 0.4 MG/DL — SIGNIFICANT CHANGE UP (ref 0.2–1.2)
BUN SERPL-MCNC: 31 MG/DL — HIGH (ref 7–23)
CALCIUM SERPL-MCNC: 9.7 MG/DL — SIGNIFICANT CHANGE UP (ref 8.5–10.1)
CHLORIDE SERPL-SCNC: 96 MMOL/L — SIGNIFICANT CHANGE UP (ref 96–108)
CO2 SERPL-SCNC: 25 MMOL/L — SIGNIFICANT CHANGE UP (ref 22–31)
CREAT SERPL-MCNC: 1.14 MG/DL — SIGNIFICANT CHANGE UP (ref 0.5–1.3)
EOSINOPHIL # BLD AUTO: 0 K/UL — SIGNIFICANT CHANGE UP (ref 0–0.5)
EOSINOPHIL NFR BLD AUTO: 0 % — SIGNIFICANT CHANGE UP (ref 0–6)
GLUCOSE SERPL-MCNC: 187 MG/DL — HIGH (ref 70–99)
HCT VFR BLD CALC: 25.4 % — LOW (ref 39–50)
HGB BLD-MCNC: 8.4 G/DL — LOW (ref 13–17)
HYPOCHROMIA BLD QL: SIGNIFICANT CHANGE UP
INR BLD: 1.1 RATIO — SIGNIFICANT CHANGE UP (ref 0.88–1.16)
LG PLATELETS BLD QL AUTO: SLIGHT — SIGNIFICANT CHANGE UP
LYMPHOCYTES # BLD AUTO: 0.79 K/UL — LOW (ref 1–3.3)
LYMPHOCYTES # BLD AUTO: 12 % — LOW (ref 13–44)
MACROCYTES BLD QL: SLIGHT — SIGNIFICANT CHANGE UP
MANUAL SMEAR VERIFICATION: YES — SIGNIFICANT CHANGE UP
MCHC RBC-ENTMCNC: 28.2 PG — SIGNIFICANT CHANGE UP (ref 27–34)
MCHC RBC-ENTMCNC: 33.1 GM/DL — SIGNIFICANT CHANGE UP (ref 32–36)
MCV RBC AUTO: 85.2 FL — SIGNIFICANT CHANGE UP (ref 80–100)
METAMYELOCYTES # FLD: 4 % — HIGH (ref 0–0)
MICROCYTES BLD QL: SLIGHT — SIGNIFICANT CHANGE UP
MONOCYTES # BLD AUTO: 0.13 K/UL — SIGNIFICANT CHANGE UP (ref 0–0.9)
MONOCYTES NFR BLD AUTO: 2 % — SIGNIFICANT CHANGE UP (ref 2–14)
MYELOCYTES NFR BLD: 2 % — HIGH (ref 0–0)
NEUTROPHILS # BLD AUTO: 5.24 K/UL — SIGNIFICANT CHANGE UP (ref 1.8–7.4)
NEUTROPHILS NFR BLD AUTO: 76 % — SIGNIFICANT CHANGE UP (ref 43–77)
NEUTS BAND # BLD: 4 % — SIGNIFICANT CHANGE UP (ref 0–8)
NRBC # BLD: 0 /100 — SIGNIFICANT CHANGE UP (ref 0–0)
NRBC # BLD: SIGNIFICANT CHANGE UP /100 WBCS (ref 0–0)
OVALOCYTES BLD QL SMEAR: SLIGHT — SIGNIFICANT CHANGE UP
PLAT MORPH BLD: NORMAL — SIGNIFICANT CHANGE UP
PLATELET # BLD AUTO: 173 K/UL — SIGNIFICANT CHANGE UP (ref 150–400)
POIKILOCYTOSIS BLD QL AUTO: SLIGHT — SIGNIFICANT CHANGE UP
POLYCHROMASIA BLD QL SMEAR: SLIGHT — SIGNIFICANT CHANGE UP
POTASSIUM SERPL-MCNC: 5.3 MMOL/L — SIGNIFICANT CHANGE UP (ref 3.5–5.3)
POTASSIUM SERPL-SCNC: 5.3 MMOL/L — SIGNIFICANT CHANGE UP (ref 3.5–5.3)
PROT SERPL-MCNC: 10.6 GM/DL — HIGH (ref 6–8.3)
PROTHROM AB SERPL-ACNC: 12 SEC — SIGNIFICANT CHANGE UP (ref 9.8–12.7)
RBC # BLD: 2.98 M/UL — LOW (ref 4.2–5.8)
RBC # FLD: 20.6 % — HIGH (ref 10.3–14.5)
RBC BLD AUTO: ABNORMAL
SCHISTOCYTES BLD QL AUTO: SLIGHT — SIGNIFICANT CHANGE UP
SODIUM SERPL-SCNC: 129 MMOL/L — LOW (ref 135–145)
TOXIC GRANULES BLD QL SMEAR: PRESENT — SIGNIFICANT CHANGE UP
WBC # BLD: 6.55 K/UL — SIGNIFICANT CHANGE UP (ref 3.8–10.5)
WBC # FLD AUTO: 6.55 K/UL — SIGNIFICANT CHANGE UP (ref 3.8–10.5)

## 2018-05-30 PROCEDURE — 73060 X-RAY EXAM OF HUMERUS: CPT | Mod: 26,LT

## 2018-05-30 PROCEDURE — 99284 EMERGENCY DEPT VISIT MOD MDM: CPT

## 2018-05-30 PROCEDURE — 73080 X-RAY EXAM OF ELBOW: CPT | Mod: 26,LT

## 2018-05-30 PROCEDURE — 73030 X-RAY EXAM OF SHOULDER: CPT | Mod: 26,LT

## 2018-05-30 PROCEDURE — 73600 X-RAY EXAM OF ANKLE: CPT | Mod: 26,LT

## 2018-05-30 RX ORDER — DEXAMETHASONE 0.5 MG/5ML
1 ELIXIR ORAL
Qty: 0 | Refills: 0 | COMMUNITY

## 2018-05-30 RX ORDER — OXYCODONE AND ACETAMINOPHEN 5; 325 MG/1; MG/1
2 TABLET ORAL ONCE
Qty: 0 | Refills: 0 | Status: DISCONTINUED | OUTPATIENT
Start: 2018-05-30 | End: 2018-05-30

## 2018-05-30 RX ORDER — MORPHINE SULFATE 50 MG/1
1 CAPSULE, EXTENDED RELEASE ORAL ONCE
Qty: 0 | Refills: 0 | Status: DISCONTINUED | OUTPATIENT
Start: 2018-05-30 | End: 2018-05-30

## 2018-05-30 RX ORDER — OXYCODONE HYDROCHLORIDE 5 MG/1
1 TABLET ORAL
Qty: 20 | Refills: 0 | OUTPATIENT
Start: 2018-05-30 | End: 2018-05-31

## 2018-05-30 RX ADMIN — MORPHINE SULFATE 1 MILLIGRAM(S): 50 CAPSULE, EXTENDED RELEASE ORAL at 08:33

## 2018-05-30 RX ADMIN — MORPHINE SULFATE 1 MILLIGRAM(S): 50 CAPSULE, EXTENDED RELEASE ORAL at 09:32

## 2018-05-30 RX ADMIN — MORPHINE SULFATE 1 MILLIGRAM(S): 50 CAPSULE, EXTENDED RELEASE ORAL at 13:14

## 2018-05-30 RX ADMIN — MORPHINE SULFATE 1 MILLIGRAM(S): 50 CAPSULE, EXTENDED RELEASE ORAL at 05:14

## 2018-05-30 NOTE — ED PROVIDER NOTE - PROGRESS NOTE DETAILS
patient placed in splint, okay for discharge from ortho persepctive. will d/c with f/up with ortho onc.

## 2018-05-30 NOTE — ED PROVIDER NOTE - MUSCULOSKELETAL, MLM
LUE deformity noted with tenderness on palpatieon, rom limited secondary to tenderness, neurovascularly intact

## 2018-05-30 NOTE — ED PROVIDER NOTE - MEDICAL DECISION MAKING DETAILS
Pending ortho. Patient care transitioned to incoming team.  All decisions regarding the progression of care will be made at their discretion.

## 2018-05-30 NOTE — ED ADULT TRIAGE NOTE - CHIEF COMPLAINT QUOTE
Pt BIBA from [facility] diagnose of a left humerus fraction. AS per nursing home fracture happened today, but denies pt having any falls. Pt received 1 tab percocet 1400. Pt c/o of pain left arms.  Alert and oriented x2 baseline.

## 2018-05-30 NOTE — ED PROVIDER NOTE - OBJECTIVE STATEMENT
Pertinent PMH/PSH/FHx/SHx and Review of Systems contained within:  89 yo m with PMH of MM with chronic pelvic fracture, dementia BIBEMS from NH for left humerus fracture on XR today. NH denies any inciting incident. In ED, patient reports pain. No aggravating or relieving factors, No fever/chills, No photophobia/eye pain/changes in vision, No ear pain/sore throat/dysphagia, No chest pain/palpitations, no SOB/cough/wheeze/stridor, No abdominal pain, No N/V/D, no dysuria/frequency/discharge, No neck/back pain, no rash, no changes in neurological status/function.

## 2018-05-30 NOTE — ED ADULT NURSE NOTE - CHIEF COMPLAINT QUOTE
Pt BIBA from [facility] diagnose of a left humerus fraction. AS per nursing home fracture happened today, but denies pt having any falls. Pt received 1 tab percocet 1400. Pt c/o of pain left arms.  Alert and oriented x2 baseline.

## 2018-05-31 NOTE — CONSULT NOTE ADULT - SUBJECTIVE AND OBJECTIVE BOX
89y Male nursing home resident, unclear hx of fall, arrived to ED with xrays from nursing home positive for Humeral shaft fx.  Unclear hand dominance. Patient is demented at baseline, non verbal, unable to obtain proper ROS and Hx.    PAST MEDICAL & SURGICAL HISTORY Taken from chart:  Multiple myeloma, failed remission  Essential hypertension  Melanoma  No significant past surgical history    MEDICATIONS  (STANDING):    Allergies    No Known Allergies    Intolerances                              8.4    6.55  )-----------( 173      ( 30 May 2018 06:01 )             25.4     30 May 2018 06:01    129    |  96     |  31     ----------------------------<  187    5.3     |  25     |  1.14     Ca    9.7        30 May 2018 06:01    TPro  10.6   /  Alb  1.9    /  TBili  0.4    /  DBili  x      /  AST  27     /  ALT  30     /  AlkPhos  73     30 May 2018 06:01    PT/INR - ( 30 May 2018 06:01 )   PT: 12.0 sec;   INR: 1.10 ratio         PTT - ( 30 May 2018 06:01 )  PTT:21.4 sec  Vital Signs Last 24 Hrs  T(C): 36.3 (05-30-18 @ 07:54), Max: 36.8 (05-30-18 @ 04:32)  T(F): 97.3 (05-30-18 @ 07:54), Max: 98.3 (05-30-18 @ 04:32)  HR: 78 (05-30-18 @ 07:54) (78 - 82)  BP: 159/100 (05-30-18 @ 07:54) (154/92 - 159/100)  RR: 16 (05-30-18 @ 07:54) (16 - 17)  SpO2: 100% (05-30-18 @ 07:54) (99% - 100%)    Imaging: XR demonstrates L  Humeral shaft Fx with lytic lesions in LUE      Gen: NAD, AAOx3    LUE: Skin intact, small deformity visualized in humerus, no signs of tenting of skin, mild swelling around humerus, no bony TTP at Shoulder/elbow/wrist/Hand/Fingers with visualization of facial discomfort, patient able to flex and extend fingers grossly, unable to obtain proper Neuro exam 2/2 baseline dementia, unable to assess sensation 2/2 baseline dementia, responds to tactile stimuli, Radial Pulse, compartments soft, hand is pink and warm    Secondary Survey: Patient unable to follow commands for exam, mild TTP with palpation of left ankle, small mass on top of head non TTP, unclear if chronic, likely not from fall, Full PROM of unaffected extremities, unable to SLR B/L but minimal pain with Passive Hip ROM, compartments soft, no bony TTP over bony prominences, no calf TTP, no TTP along axial spine.      A/P: 89y Male with Hx of multiple myeloma present with L Humeral Shaft Fx  - Unclear hx of fall as patient is demented, patient hx of multiple myeloma with signs of lytic lesion in LUE  - FU Ankle Xrays  -pain control  -in Coaptation splint 90deg flexion  -NWB LUE  -keep splint clean dry intact  -rest ice elevate affected elbow  -sling for comfort  -NV grossly intact post splint application  - with hx of multiple myeloma, dementia, patient not surgical candidate as risk of surgery will be high  - Recommend Saenz brace as outpatient   - please FU with Dr Cardenas, Orthopedic oncologist as outpatient for further follow up and recommendations  - Ortho Stable for DC

## 2018-06-11 ENCOUNTER — APPOINTMENT (OUTPATIENT)
Dept: ORTHOPEDIC SURGERY | Facility: CLINIC | Age: 83
End: 2018-06-11

## 2018-06-12 ENCOUNTER — INPATIENT (INPATIENT)
Facility: HOSPITAL | Age: 83
LOS: 3 days | End: 2018-06-16
Attending: INTERNAL MEDICINE | Admitting: INTERNAL MEDICINE
Payer: MEDICARE

## 2018-06-12 VITALS
TEMPERATURE: 98 F | HEART RATE: 72 BPM | OXYGEN SATURATION: 92 % | RESPIRATION RATE: 20 BRPM | HEIGHT: 73 IN | DIASTOLIC BLOOD PRESSURE: 54 MMHG | SYSTOLIC BLOOD PRESSURE: 84 MMHG | WEIGHT: 100.09 LBS

## 2018-06-12 LAB
ALBUMIN SERPL ELPH-MCNC: 1.1 G/DL — LOW (ref 3.3–5)
ALP SERPL-CCNC: 85 U/L — SIGNIFICANT CHANGE UP (ref 40–120)
ALT FLD-CCNC: 17 U/L — SIGNIFICANT CHANGE UP (ref 12–78)
ANION GAP SERPL CALC-SCNC: 5 MMOL/L — SIGNIFICANT CHANGE UP (ref 5–17)
ANION GAP SERPL CALC-SCNC: 8 MMOL/L — SIGNIFICANT CHANGE UP (ref 5–17)
APTT BLD: 26 SEC — LOW (ref 27.5–37.4)
AST SERPL-CCNC: 19 U/L — SIGNIFICANT CHANGE UP (ref 15–37)
BILIRUB SERPL-MCNC: 0.2 MG/DL — SIGNIFICANT CHANGE UP (ref 0.2–1.2)
BUN SERPL-MCNC: 41 MG/DL — HIGH (ref 7–23)
BUN SERPL-MCNC: 47 MG/DL — HIGH (ref 7–23)
CALCIUM SERPL-MCNC: 9.9 MG/DL — SIGNIFICANT CHANGE UP (ref 8.5–10.1)
CALCIUM SERPL-MCNC: 9.9 MG/DL — SIGNIFICANT CHANGE UP (ref 8.5–10.1)
CHLORIDE SERPL-SCNC: 108 MMOL/L — SIGNIFICANT CHANGE UP (ref 96–108)
CHLORIDE SERPL-SCNC: 109 MMOL/L — HIGH (ref 96–108)
CO2 SERPL-SCNC: 22 MMOL/L — SIGNIFICANT CHANGE UP (ref 22–31)
CO2 SERPL-SCNC: 26 MMOL/L — SIGNIFICANT CHANGE UP (ref 22–31)
CREAT SERPL-MCNC: 1.82 MG/DL — HIGH (ref 0.5–1.3)
CREAT SERPL-MCNC: 1.87 MG/DL — HIGH (ref 0.5–1.3)
GLUCOSE SERPL-MCNC: 159 MG/DL — HIGH (ref 70–99)
GLUCOSE SERPL-MCNC: 166 MG/DL — HIGH (ref 70–99)
HCT VFR BLD CALC: 18.2 % — CRITICAL LOW (ref 39–50)
HCT VFR BLD CALC: 24.3 % — LOW (ref 39–50)
HCT VFR BLD CALC: 25.8 % — LOW (ref 39–50)
HGB BLD-MCNC: 5.7 G/DL — CRITICAL LOW (ref 13–17)
HGB BLD-MCNC: 8.4 G/DL — LOW (ref 13–17)
HGB BLD-MCNC: 8.7 G/DL — LOW (ref 13–17)
INR BLD: 1.55 RATIO — HIGH (ref 0.88–1.16)
MCHC RBC-ENTMCNC: 27.8 PG — SIGNIFICANT CHANGE UP (ref 27–34)
MCHC RBC-ENTMCNC: 28.8 PG — SIGNIFICANT CHANGE UP (ref 27–34)
MCHC RBC-ENTMCNC: 29.1 PG — SIGNIFICANT CHANGE UP (ref 27–34)
MCHC RBC-ENTMCNC: 31.3 GM/DL — LOW (ref 32–36)
MCHC RBC-ENTMCNC: 33.7 GM/DL — SIGNIFICANT CHANGE UP (ref 32–36)
MCHC RBC-ENTMCNC: 34.6 GM/DL — SIGNIFICANT CHANGE UP (ref 32–36)
MCV RBC AUTO: 84.1 FL — SIGNIFICANT CHANGE UP (ref 80–100)
MCV RBC AUTO: 85.4 FL — SIGNIFICANT CHANGE UP (ref 80–100)
MCV RBC AUTO: 88.8 FL — SIGNIFICANT CHANGE UP (ref 80–100)
NRBC # BLD: 4 /100 WBCS — HIGH (ref 0–0)
NRBC # BLD: 5 /100 WBCS — HIGH (ref 0–0)
NRBC # BLD: 5 /100 WBCS — HIGH (ref 0–0)
PLATELET # BLD AUTO: 159 K/UL — SIGNIFICANT CHANGE UP (ref 150–400)
PLATELET # BLD AUTO: 168 K/UL — SIGNIFICANT CHANGE UP (ref 150–400)
PLATELET # BLD AUTO: 177 K/UL — SIGNIFICANT CHANGE UP (ref 150–400)
POTASSIUM SERPL-MCNC: 4.9 MMOL/L — SIGNIFICANT CHANGE UP (ref 3.5–5.3)
POTASSIUM SERPL-MCNC: 5.4 MMOL/L — HIGH (ref 3.5–5.3)
POTASSIUM SERPL-SCNC: 4.9 MMOL/L — SIGNIFICANT CHANGE UP (ref 3.5–5.3)
POTASSIUM SERPL-SCNC: 5.4 MMOL/L — HIGH (ref 3.5–5.3)
PROT SERPL-MCNC: 9.7 GM/DL — HIGH (ref 6–8.3)
PROTHROM AB SERPL-ACNC: 17 SEC — HIGH (ref 9.8–12.7)
RBC # BLD: 2.05 M/UL — LOW (ref 4.2–5.8)
RBC # BLD: 2.89 M/UL — LOW (ref 4.2–5.8)
RBC # BLD: 3.02 M/UL — LOW (ref 4.2–5.8)
RBC # FLD: 17.4 % — HIGH (ref 10.3–14.5)
RBC # FLD: 17.6 % — HIGH (ref 10.3–14.5)
RBC # FLD: 20.3 % — HIGH (ref 10.3–14.5)
SODIUM SERPL-SCNC: 138 MMOL/L — SIGNIFICANT CHANGE UP (ref 135–145)
SODIUM SERPL-SCNC: 140 MMOL/L — SIGNIFICANT CHANGE UP (ref 135–145)
WBC # BLD: 12.21 K/UL — HIGH (ref 3.8–10.5)
WBC # BLD: 12.31 K/UL — HIGH (ref 3.8–10.5)
WBC # BLD: 14.07 K/UL — HIGH (ref 3.8–10.5)
WBC # FLD AUTO: 12.21 K/UL — HIGH (ref 3.8–10.5)
WBC # FLD AUTO: 12.31 K/UL — HIGH (ref 3.8–10.5)
WBC # FLD AUTO: 14.07 K/UL — HIGH (ref 3.8–10.5)

## 2018-06-12 PROCEDURE — 36680 INSERT NEEDLE BONE CAVITY: CPT

## 2018-06-12 PROCEDURE — 74176 CT ABD & PELVIS W/O CONTRAST: CPT | Mod: 26

## 2018-06-12 PROCEDURE — 93010 ELECTROCARDIOGRAM REPORT: CPT

## 2018-06-12 PROCEDURE — 99291 CRITICAL CARE FIRST HOUR: CPT

## 2018-06-12 PROCEDURE — 99285 EMERGENCY DEPT VISIT HI MDM: CPT | Mod: 25

## 2018-06-12 RX ORDER — SODIUM CHLORIDE 9 MG/ML
1000 INJECTION INTRAMUSCULAR; INTRAVENOUS; SUBCUTANEOUS ONCE
Qty: 0 | Refills: 0 | Status: COMPLETED | OUTPATIENT
Start: 2018-06-12 | End: 2018-06-12

## 2018-06-12 RX ORDER — PANTOPRAZOLE SODIUM 20 MG/1
8 TABLET, DELAYED RELEASE ORAL
Qty: 80 | Refills: 0 | Status: DISCONTINUED | OUTPATIENT
Start: 2018-06-12 | End: 2018-06-13

## 2018-06-12 RX ADMIN — PANTOPRAZOLE SODIUM 10 MG/HR: 20 TABLET, DELAYED RELEASE ORAL at 08:12

## 2018-06-12 RX ADMIN — PANTOPRAZOLE SODIUM 10 MG/HR: 20 TABLET, DELAYED RELEASE ORAL at 15:29

## 2018-06-12 RX ADMIN — SODIUM CHLORIDE 1000 MILLILITER(S): 9 INJECTION INTRAMUSCULAR; INTRAVENOUS; SUBCUTANEOUS at 03:55

## 2018-06-12 RX ADMIN — SODIUM CHLORIDE 1000 MILLILITER(S): 9 INJECTION INTRAMUSCULAR; INTRAVENOUS; SUBCUTANEOUS at 05:03

## 2018-06-12 NOTE — H&P ADULT - NSHPPHYSICALEXAM_GEN_ALL_CORE
PHYSICAL EXAM:  GENERAL: alert, non verbal  EYES: EOMI, PERRLA, conjunctiva and sclera clear  NECK: Supple, No JVD  CHEST/LUNG: CTAB. No cough, wheeze, rales.   HEART: Regular rate and rhythm. No murmurs, rubs, or gallops.   ABDOMEN: Soft, Nontender, Nondistended. Bowel sounds present  brown blood in the rectum  EXTREMITIES:  2+ Peripheral Pulses, No clubbing, cyanosis, or edema

## 2018-06-12 NOTE — ED ADULT NURSE REASSESSMENT NOTE - NS ED NURSE REASSESS COMMENT FT1
20 gauge IO to Right humerus
Attempts to established a line appropriate for a Blood transfusion unsuccessful. PIV gauge 24 patent. Dr. Sherman aware.: IO to be placed by MD.
pt received asleep, responsive to touch, at 0740 2nd unit prbc started. left arm cast noted. pt stable at this time. awaiting icu

## 2018-06-12 NOTE — ED PROVIDER NOTE - OBJECTIVE STATEMENT
89 yo M saul from [facility] for bleeding per rectum.  Pt. cannot give hx due to chronic mental status.  ROS: negative for fever, cough, headache, chest pain, shortness of breath, abd pain, nausea, vomiting, diarrhea, rash, paresthesia, and weakness--all other systems reviewed are negative.   PMH: Multiple myeloma with bone involvement, DNR; Meds: See EMR; SH: Denies smoking/drinking/drug use 89 yo M saul from [facility] for bleeding per rectum.  Pt. cannot give hx due to chronic mental status.  ROS: negative for fever, cough, headache, chest pain, shortness of breath, abd pain, nausea, vomiting, diarrhea, rash, paresthesia, and weakness--all other systems reviewed are negative.   PMH: Multiple myeloma with bone involvement, hyponatremia, pituitary neoplasm, anemia, hyperprolactinemia, HTN, hydrocele, GERD, DNR--molst in chart; Meds: See EMR; SH: Denies smoking/drinking/drug use

## 2018-06-12 NOTE — ED ADULT TRIAGE NOTE - CHIEF COMPLAINT QUOTE
BIBA from [facility], BRB per rectum, patient not eating. DNR/DOMINICK. L- arm cast. 24 gauge R- hand

## 2018-06-12 NOTE — CONSULT NOTE ADULT - SUBJECTIVE AND OBJECTIVE BOX
full consult dictated    Plan: Pt with BRBPR - no pain- possible diverticular bleed. Bleeding also might be from Radiation induced colitis; Pt to be transfused to HGB >9; clear liquid diet; follow CBC

## 2018-06-12 NOTE — ED ADULT NURSE NOTE - CHIEF COMPLAINT QUOTE
BIBA from [facility], BRB per rectum, patient not eating. DNR/DOMINICK. L- arm cast. 24 gauge R- hand

## 2018-06-12 NOTE — ED PROVIDER NOTE - MEDICAL DECISION MAKING DETAILS
89 yo M with hypotension, reported rectal bleeding  -basic labs, coags, type and screen, CTA abd/pel, DNR, NS bolus, cardiac monitor, b/l IV, f/u results, reeval, likely admission

## 2018-06-12 NOTE — ED PROVIDER NOTE - CADM POA PRESS ULCER
Patient admitted from OR was drowsy, trying to get out of bed and was trying pulling her IVs out. Restraints was applied. Vital were stable. With left forearm surgical dry and intact .  at the bedside.    We took restraint off but patient started No

## 2018-06-12 NOTE — H&P ADULT - HISTORY OF PRESENT ILLNESS
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  Found with H/H of 5.7/18.2 and hypotensive, not responsive after 2L of IVF. BP remain in the 80's  Pt receiving 1 unit of RBC in ED, and has an IO to right humerus. Patient is a 89y old  Male from NH, DNR, with pMhx of HTN, multiple myeloma w mets to bone s/p chemo & radiation, right femur IMN for prophylactic fixation ([facility] January 2018), brought in to ED from for rectal bleed.  Found with H/H of 5.7/18.2 and hypotensive, not responsive after 2L of IVF. BP remain in the 80's  Pt receiving 1 unit of RBC in ED, and has an IO to right humerus.

## 2018-06-12 NOTE — ED PROVIDER NOTE - PHYSICAL EXAMINATION
Vitals: hypotensive 84/54, marked difference from priori visits  Gen: Awake, non-verbal, NAD, lying comfortably in stretcher  Head: ncat, perrla, eomi b/l  Neck: supple, no lymphadenopathy, no midline deviation  Heart: rrr, no m/r/g  Lungs: CTA b/l, no rales/ronchi/wheezes  Abd: soft, nontender, non-distended, no rebound or guarding  Ext: no clubbing/cyanosis/edema  Neuro: uncooperative for exam, no acute deficits Vitals: hypotensive 84/54, marked difference from priori visits  Gen: Awake, non-verbal, NAD, lying comfortably in stretcher  Head: ncat, perrla, eomi b/l  Neck: supple, no lymphadenopathy, no midline deviation  Heart: rrr, no m/r/g  Lungs: CTA b/l, no rales/ronchi/wheezes  Abd: soft, nontender, non-distended, no rebound or guarding  Ext: no clubbing/cyanosis/edema  Neuro: uncooperative for exam, no acute deficits, bedbound, unable to follow commands  rectal, gross blood intermixed with stool in diaper

## 2018-06-12 NOTE — H&P ADULT - ASSESSMENT
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  Pt to admit to critical care for GI bleed and hypotension, anemia due to blood loss  -admit to critical care  -trend cbc [id number]  -blood transfusion: 2 units ordered, pt receiving 1 unit  -IVF  -DVT prophylaxis: no chem, SCD's only  -protonix gtt  -GI consult  -surgery consult

## 2018-06-12 NOTE — H&P ADULT - NSHPLABSRESULTS_GEN_ALL_CORE
.  LABS:                         5.7    14.07 )-----------( 177      ( 12 Jun 2018 02:56 )             18.2     06-12    138  |  108  |  41<H>  ----------------------------<  166<H>  5.4<H>   |  22  |  1.82<H>    Ca    9.9      12 Jun 2018 02:56    TPro  9.7<H>  /  Alb  1.1<L>  /  TBili  0.2  /  DBili  x   /  AST  19  /  ALT  17  /  AlkPhos  85  06-12    PT/INR - ( 12 Jun 2018 02:56 )   PT: 17.0 sec;   INR: 1.55 ratio         PTT - ( 12 Jun 2018 02:56 )  PTT:26.0 sec          RADIOLOGY, EKG & ADDITIONAL TESTS: Reviewed.

## 2018-06-12 NOTE — ED PROCEDURE NOTE - CPROC ED INTRAOSS CONFIRM1
aspiration of blood/marrow mixture without difficulty/flushing with fluid/needle stands without support

## 2018-06-12 NOTE — ED ADULT NURSE NOTE - OBJECTIVE STATEMENT
Patient came into the ER via EMS from nursing home for GIB and hypotension. Non verbal but reaspond to tactile stimuli.

## 2018-06-12 NOTE — H&P ADULT - ATTENDING COMMENTS
Agree with above.  89M NHR PMH MM a/w pathological fractures p/w BRBPR / melena.  In ED, hypotensive. IO placed and brought to ICU for acute GIB likely lower.  GI consulted, recommend further stabilization, likely diverticular source.  Hgb 5.7, given 2U pRBCs with good response (5.7 to 8.7).  - Trend CBCs  - PPI gtt  - Not responsive enough to advance diet.  - Trend CBCs for now.    Total attending critical care time spent: 45 minutes

## 2018-06-13 LAB
ALBUMIN SERPL ELPH-MCNC: 1.1 G/DL — LOW (ref 3.3–5)
ALP SERPL-CCNC: 91 U/L — SIGNIFICANT CHANGE UP (ref 40–120)
ALT FLD-CCNC: 22 U/L — SIGNIFICANT CHANGE UP (ref 12–78)
ANION GAP SERPL CALC-SCNC: 7 MMOL/L — SIGNIFICANT CHANGE UP (ref 5–17)
APPEARANCE UR: CLEAR — SIGNIFICANT CHANGE UP
AST SERPL-CCNC: 25 U/L — SIGNIFICANT CHANGE UP (ref 15–37)
BACTERIA # UR AUTO: ABNORMAL
BILIRUB SERPL-MCNC: 0.5 MG/DL — SIGNIFICANT CHANGE UP (ref 0.2–1.2)
BILIRUB UR-MCNC: ABNORMAL
BUN SERPL-MCNC: 49 MG/DL — HIGH (ref 7–23)
CALCIUM SERPL-MCNC: 10.2 MG/DL — HIGH (ref 8.5–10.1)
CHLORIDE SERPL-SCNC: 110 MMOL/L — HIGH (ref 96–108)
CO2 SERPL-SCNC: 24 MMOL/L — SIGNIFICANT CHANGE UP (ref 22–31)
COLOR SPEC: YELLOW — SIGNIFICANT CHANGE UP
COMMENT - URINE: SIGNIFICANT CHANGE UP
CREAT SERPL-MCNC: 2.09 MG/DL — HIGH (ref 0.5–1.3)
DIFF PNL FLD: NEGATIVE — SIGNIFICANT CHANGE UP
EPI CELLS # UR: SIGNIFICANT CHANGE UP
FLUAV SPEC QL CULT: NEGATIVE — SIGNIFICANT CHANGE UP
FLUBV AG SPEC QL IA: NEGATIVE — SIGNIFICANT CHANGE UP
GLUCOSE SERPL-MCNC: 96 MG/DL — SIGNIFICANT CHANGE UP (ref 70–99)
GLUCOSE UR QL: NEGATIVE MG/DL — SIGNIFICANT CHANGE UP
GRAM STN FLD: SIGNIFICANT CHANGE UP
HCT VFR BLD CALC: 22.7 % — LOW (ref 39–50)
HGB BLD-MCNC: 7.8 G/DL — LOW (ref 13–17)
KETONES UR-MCNC: NEGATIVE — SIGNIFICANT CHANGE UP
LEGIONELLA AG UR QL: NEGATIVE — SIGNIFICANT CHANGE UP
LEUKOCYTE ESTERASE UR-ACNC: ABNORMAL
MAGNESIUM SERPL-MCNC: 2.2 MG/DL — SIGNIFICANT CHANGE UP (ref 1.6–2.6)
MCHC RBC-ENTMCNC: 29.1 PG — SIGNIFICANT CHANGE UP (ref 27–34)
MCHC RBC-ENTMCNC: 34.4 GM/DL — SIGNIFICANT CHANGE UP (ref 32–36)
MCV RBC AUTO: 84.7 FL — SIGNIFICANT CHANGE UP (ref 80–100)
NITRITE UR-MCNC: NEGATIVE — SIGNIFICANT CHANGE UP
NRBC # BLD: 4 /100 WBCS — HIGH (ref 0–0)
PH UR: 5 — SIGNIFICANT CHANGE UP (ref 5–8)
PHOSPHATE SERPL-MCNC: 3.2 MG/DL — SIGNIFICANT CHANGE UP (ref 2.5–4.5)
PLATELET # BLD AUTO: 149 K/UL — LOW (ref 150–400)
POTASSIUM SERPL-MCNC: 4.4 MMOL/L — SIGNIFICANT CHANGE UP (ref 3.5–5.3)
POTASSIUM SERPL-SCNC: 4.4 MMOL/L — SIGNIFICANT CHANGE UP (ref 3.5–5.3)
PROCALCITONIN SERPL-MCNC: 2.36 NG/ML — HIGH (ref 0.02–0.1)
PROT SERPL-MCNC: 10.3 GM/DL — HIGH (ref 6–8.3)
PROT UR-MCNC: 30 MG/DL
RBC # BLD: 2.68 M/UL — LOW (ref 4.2–5.8)
RBC # FLD: 18.1 % — HIGH (ref 10.3–14.5)
RBC CASTS # UR COMP ASSIST: SIGNIFICANT CHANGE UP /HPF (ref 0–4)
SODIUM SERPL-SCNC: 141 MMOL/L — SIGNIFICANT CHANGE UP (ref 135–145)
SP GR SPEC: 1.02 — SIGNIFICANT CHANGE UP (ref 1.01–1.02)
SPECIMEN SOURCE: SIGNIFICANT CHANGE UP
UROBILINOGEN FLD QL: NEGATIVE MG/DL — SIGNIFICANT CHANGE UP
WBC # BLD: 13.42 K/UL — HIGH (ref 3.8–10.5)
WBC # FLD AUTO: 13.42 K/UL — HIGH (ref 3.8–10.5)
WBC UR QL: SIGNIFICANT CHANGE UP

## 2018-06-13 PROCEDURE — 99233 SBSQ HOSP IP/OBS HIGH 50: CPT | Mod: GC

## 2018-06-13 PROCEDURE — 70450 CT HEAD/BRAIN W/O DYE: CPT | Mod: 26

## 2018-06-13 PROCEDURE — 71045 X-RAY EXAM CHEST 1 VIEW: CPT | Mod: 26

## 2018-06-13 PROCEDURE — 99497 ADVNCD CARE PLAN 30 MIN: CPT

## 2018-06-13 RX ORDER — ACETAMINOPHEN 500 MG
1000 TABLET ORAL ONCE
Qty: 0 | Refills: 0 | Status: COMPLETED | OUTPATIENT
Start: 2018-06-13 | End: 2018-06-13

## 2018-06-13 RX ORDER — ACETAMINOPHEN 500 MG
650 TABLET ORAL EVERY 6 HOURS
Qty: 0 | Refills: 0 | Status: DISCONTINUED | OUTPATIENT
Start: 2018-06-13 | End: 2018-06-16

## 2018-06-13 RX ORDER — COLLAGENASE CLOSTRIDIUM HIST. 250 UNIT/G
1 OINTMENT (GRAM) TOPICAL
Qty: 0 | Refills: 0 | Status: DISCONTINUED | OUTPATIENT
Start: 2018-06-13 | End: 2018-06-14

## 2018-06-13 RX ORDER — PANTOPRAZOLE SODIUM 20 MG/1
40 TABLET, DELAYED RELEASE ORAL EVERY 12 HOURS
Qty: 0 | Refills: 0 | Status: DISCONTINUED | OUTPATIENT
Start: 2018-06-13 | End: 2018-06-16

## 2018-06-13 RX ADMIN — PANTOPRAZOLE SODIUM 10 MG/HR: 20 TABLET, DELAYED RELEASE ORAL at 11:27

## 2018-06-13 RX ADMIN — PANTOPRAZOLE SODIUM 10 MG/HR: 20 TABLET, DELAYED RELEASE ORAL at 05:34

## 2018-06-13 RX ADMIN — Medication 650 MILLIGRAM(S): at 20:48

## 2018-06-13 RX ADMIN — Medication 400 MILLIGRAM(S): at 05:34

## 2018-06-13 RX ADMIN — PANTOPRAZOLE SODIUM 40 MILLIGRAM(S): 20 TABLET, DELAYED RELEASE ORAL at 18:12

## 2018-06-13 NOTE — CONSULT NOTE ADULT - SUBJECTIVE AND OBJECTIVE BOX
HPI:  Patient is a 89y old  Male from NH, DNR, with pMhx of HTN, multiple myeloma w mets to bone s/p chemo & radiation, right femur IMN for prophylactic fixation ([facility] 2018), brought in to ED from for rectal bleed. Found with H/H of 5.7/18.2 and hypotensive, not responsive after 2L of IVF. BP remain in the 80's. Pt receiving 1 unit of RBC in ED, and has an IO to right humerus.     PERTINENT PMH REVIEWED:  [X ] YES [ ] NO           SOCIAL HISTORY:  Significant other/partner:  [ ] YES  [ ] NO            Children:  [X] YES  [ ] NO                   Tenriism/Spirituality:  Substance hx:  [ ] YES   [ ] NO           Tobacco hx:  [ ] YES  [ ] NO             Alcohol hx: [ ] YES  [ ] NO        Home Opioid hx:  [ ] YES  [ ] NO   Living Situation: [ ] Home  [ ] Long term care  [X ] Rehab    FAMILY HISTORY:  No pertinent family history in first degree relatives    [X ] Family history non contributory     BASELINE ADLs (prior to admission):  Independent [ ] moderately [ ] fully   Dependent   [ ] moderately [X ] fully    Code Status:                      MOLST  [X ] YES [ ] NO 18   Living Will  [ ] YES [X ] NO    Health Care Proxy [ X] YES  [ ] NO      [ ] Surrogate  [X ] HCP  [ ] Guardian: Anselmo Emeka / [phone]                                                                    Allergies  No Known Allergies    Intolerances    MEDICATIONS  (STANDING):  pantoprazole Infusion 8 mG/Hr (10 mL/Hr) IV Continuous <Continuous>    MEDICATIONS  (PRN):    PRESENT SYMPTOMS:  Source: [ ] Patient   [x ] Family   [x ] Team     Pain: [x ] YES [ ] NO  Onset:                    Location: Back pain  Duration:                 Character:            Aggravating factors: movement     Relieving factors: Pain medication and positioning.  Radiation:              Timing:                             Severity:      Dyspnea: [x ] YES [ ] NO - Mild [x ]  Moderate [ ]  Severe [ ]    Anxiety: [ ] YES [x ] NO  Fatigue: [x ] YES [ ] NO   Nausea: [ ] YES [x ] NO  Loss of appetite: [x ] YES [ ] NO   Constipation: [ ] YES [x ] NO     Other Symptoms:  [ ] All other review of systems negative   [x ] Unable to obtain due to poor mentation     Does patient meet criteria for Severe Protein Calorie Malnutrition?  Yes [x ]  No [ ]  PPSV 30% or below [x ]  Anasarca [ ]  Albumin < 2 [x ] Catabolic State [ ] Poor nutritional intake [x ] Significant weight loss [ ]      Palliative Performance Status Version 2:  30 %  ECOG -      Vital Signs Last 24 Hrs  T(C): 37 (2018 11:52), Max: 38.1 (2018 05:00)  T(F): 98.6 (2018 11:52), Max: 100.6 (2018 05:00)  HR: 99 (2018 11:00) (86 - 113)  BP: 130/68 (2018 11:00) (109/50 - 173/77)  BP(mean): 79 (2018 11:00) (63 - 96)  RR: 22 (2018 11:00) (16 - 30)  SpO2: 96% (2018 09:50) (96% - 100%)    Physical Exam:    General: [ ] Alert,  A&O x     [x ] lethargic   [ ] Agitated   [ ] Cachexia   HEENT: [ ] Normal   [x ] Dry mouth   [ ] ET Tube    [ ] Trach   Lungs: [ ] Clear [x ] Rhonchi  [ ] Crackles [ ] Wheezing [ ] Tachypnea  [ ] Audible excessive secretions    Cardiovascular:  [ ] Regular rate and rhythm  [ ] Irregular [x ] Tachycardia   [ ] Bradycardia   Abdomen: [x ] Soft  [ ] Distended  [ x] +BS  [x ] Non tender [ ] Tender  [ ]PEG   [ ] NGT   Last BM:     Genitourinary: [ ] Normal [ x] Incontinent   [ ] Oliguria/Anuria   [ ] Patel  Musculoskeletal:  [ ] Normal   [ ] Generalized weakness  [ x] Bedbound  [ ] Edema   Neurological: [ ] No focal deficits  [ x] Cognitive impairment     Skin: [x ] Normal   [ ] Pressure ulcers     LABS:                        7.8    13.42 )-----------( 149      ( 2018 04:15 )             22.7     06-13    141  |  110<H>  |  49<H>  ----------------------------<  96  4.4   |  24  |  2.09<H>    Ca    10.2<H>      2018 04:15  Phos  3.2     06-13  Mg     2.2     06-13    TPro  10.3<H>  /  Alb  1.1<L>  /  TBili  0.5  /  DBili  x   /  AST  25  /  ALT  22  /  AlkPhos  91      PT/INR - ( 2018 02:56 )   PT: 17.0 sec;   INR: 1.55 ratio       PTT - ( 2018 02:56 )  PTT:26.0 sec  Urinalysis Basic - ( 2018 09:45 )    Color: Yellow / Appearance: Clear / S.020 / pH: x  Gluc: x / Ketone: Negative  / Bili: Small / Urobili: Negative mg/dL   Blood: x / Protein: 30 mg/dL / Nitrite: Negative   Leuk Esterase: Trace / RBC: 0-2 /HPF / WBC 0-2   Sq Epi: x / Non Sq Epi: Few / Bacteria: Occasional    I&O's Summary    2018 07:  -  2018 07:00  --------------------------------------------------------  IN: 568 mL / OUT: 500 mL / NET: 68 mL    2018 07:  -  2018 12:24  --------------------------------------------------------  IN: 30 mL / OUT: 150 mL / NET: -120 mL    RADIOLOGY & ADDITIONAL STUDIES:    Referrals:  Hospice [ ]   Chaplaincy [ ]    Child Life [ ]   Social Work [X ]   Case Management [ ]   Holistic Therapy [ ]

## 2018-06-13 NOTE — PHYSICAL THERAPY INITIAL EVALUATION ADULT - MODALITIES TREATMENT COMMENTS
Pt is on a Everfi P500 low air loss surface. Pt has unstageable pressure ulcer over L buttocks measuring 5.0cmx4.3axl6vg depth. Wound is 100% non-viable tissue (100% brown, soft eschar). Scant fibrinous drainage noted. Periwound skin with IAD (pt is incontinent of urine & semi-formed stool). No induration, fluctuance, erythema, increased warmth, or odor noted. Pt has signs of IAD over B/L buttocks & perineum.

## 2018-06-13 NOTE — GOALS OF CARE CONVERSATION - PERSONAL ADVANCE DIRECTIVE - TREATMENT GUIDELINE COMMENT
6/13/18 called Emeka Arzolat (daughter) and discussed goals of care and advance care planning. Patient is DNR/ DNI and comfort measures only. Since patient is unable to make his needs known, daughter /HCP stated to start NG Tube feeding for now and will discuss comfort care in few days.

## 2018-06-13 NOTE — CONSULT NOTE ADULT - ASSESSMENT
Family meeting on: DATE: 6/13/18 called Emeka Briones (daughter) and discussed goals of care and advance care planning. Patient is DNR/ DNI and comfort measures only. Since patient is unable to make his needs known, daughter /HCP stated to start NG Tube feeding for now and will discuss comfort care in few days.   RECOMMENDATIONS:    DNR/ DNI, COMFORT CARE.                                       CONSIDER PAIN AND SYMPTOM MANAGEMENT WITH HOSPICE CARE.

## 2018-06-13 NOTE — PHYSICAL THERAPY INITIAL EVALUATION ADULT - PERTINENT HX OF CURRENT PROBLEM, REHAB EVAL
Pt is a 89yo M admitted from [facility] due to rectal bleed. H/H upon admission was 5/7.18.2, hypotensive, not responsive after 2L of IVF. Pt received 1 unit PRBCs in ED, had IO to R humerus placed, & was transferred to ICU for further care. Pt s/p 2 units PRBCs on 6/12. Pt DNR/DNI. WBC = 13.42 (trending up). H/H post transfusion = 7.8/22.7. Per GI consult: possible diverticular bleed vs. radiation induced colitis, recommendation: transfuse to Hgb > 9.

## 2018-06-13 NOTE — PROGRESS NOTE ADULT - SUBJECTIVE AND OBJECTIVE BOX
# CC: BRBPR    ## HPI:  89y old  Male from NH, DNR, with pMhx of HTN, multiple myeloma w mets to bone s/p chemo & radiation, right femur IMN for prophylactic fixation ([facility] January 2018), brought in to ED from for rectal bleed.  Found with H/H of 5.7/18.2 and hypotensive, not responsive after 2L of IVF. BP remain in the 80's  Pt receiving 1 unit of RBC in ED, and has an IO to right humerus.    **O/N:**  No additional episodes of bleeding.  1 BM that was reportedly green, no melena no hematochezia.    ## ROS: Unobtainable    ## Labs:  CBC:                        7.8    13.42 )-----------( 149      ( 13 Jun 2018 04:15 )             22.7     Hemoglobin:  7.8 g/dL <L> (06-13-18 @ 04:15)  8.4 g/dL <L> (06-12-18 @ 17:18)  8.7 g/dL <L> (06-12-18 @ 12:03)  5.7 g/dL <LL> (06-12-18 @ 02:56)    Chem: 06-13    141  |  110<H>  |  49<H>  ----------------------------<  96  4.4   |  24  |  2.09<H>    Ca    10.2<H>      13 Jun 2018 04:15  Phos  3.2     06-13  Mg     2.2     06-13    TPro  10.3<H>  /  Alb  1.1<L>  /  TBili  0.5  /  DBili  x   /  AST  25  /  ALT  22  /  AlkPhos  91  06-13    Coags:  PT/INR - ( 12 Jun 2018 02:56 )   PT: 17.0 sec;   INR: 1.55 ratio    PTT - ( 12 Jun 2018 02:56 )  PTT:26.0 sec    Culture - Sputum (collected 13 Jun 2018 10:35)  Source: .Sputum Sputumtrap received  Gram Stain (13 Jun 2018 14:49):    Few polymorphonuclear leukocytes per low power field    Rare Squamous epithelial cells per low power field    Moderate Gram positive cocci in pairs, chains and clusters    Rare Yeast like cells    Few Gram Negative Rods    per oil power field    ## Imaging:  < from: Xray Chest 1 View- PORTABLE-Urgent (06.13.18 @ 07:13) >  Questionable infiltrates. Follow-up study is recommended as clinically warranted.  < end of copied text >    ## Medications:  pantoprazole  Injectable 40 milliGRAM(s) IV Push every 12 hours    ## O/E:  T(C): 37 (13 Jun 2018 11:52), Max: 38.1 (13 Jun 2018 05:00)  HR: 101 (13 Jun 2018 15:00) (91 - 113)  BP: 133/60 (13 Jun 2018 15:00) (109/50 - 173/77)  BP(mean): 74 (13 Jun 2018 15:00) (63 - 96)  RR: 17 (13 Jun 2018 15:00) (16 - 30)  SpO2: 99% (13 Jun 2018 12:00) (96% - 100%)  IN: 568 mL / OUT: 500 mL / NET: 68 mL    Gen: lying comfortably in bed in no apparent distress  HEENT: PERRL  Resp: CTA B/L no c/r/w  CVS: S1S2 no m/r/g  Abd: soft NT/ND +BS  Ext: no c/c/e  Neuro: minimally responsive, grimaces to painful stimuli    ## Daily Issues  - Analgesia: N/A  - Sedation: N/A  - HOB elevation: Y  - GI ppx (ulcers): N/A  - DVT ppx: Hep SC  - Nutrition: PO diet  - Central line: N  - Patel: N    ## Assessment / Plan:    ## Code status:  Goals of care discussion: [x] yes [ ] no  [x] full code  [ ] DNR  [ ] DNI  [ ] MOLST # CC: BRBPR    ## HPI:  89y old  Male from NH, DNR, with pMhx of HTN, multiple myeloma w mets to bone s/p chemo & radiation, right femur IMN for prophylactic fixation ([facility] January 2018), brought in to ED from for rectal bleed.  Found with H/H of 5.7/18.2 and hypotensive, not responsive after 2L of IVF. BP remain in the 80's  Pt receiving 1 unit of RBC in ED, and has an IO to right humerus.    **O/N:**  No additional episodes of bleeding.  1 BM that was reportedly green, no melena no hematochezia.    ## ROS: Unobtainable    ## Labs:  CBC:                        7.8    13.42 )-----------( 149      ( 13 Jun 2018 04:15 )             22.7     Hemoglobin:  7.8 g/dL <L> (06-13-18 @ 04:15)  8.4 g/dL <L> (06-12-18 @ 17:18)  8.7 g/dL <L> (06-12-18 @ 12:03)  5.7 g/dL <LL> (06-12-18 @ 02:56)    Chem: 06-13    141  |  110<H>  |  49<H>  ----------------------------<  96  4.4   |  24  |  2.09<H>    Ca    10.2<H>      13 Jun 2018 04:15  Phos  3.2     06-13  Mg     2.2     06-13    TPro  10.3<H>  /  Alb  1.1<L>  /  TBili  0.5  /  DBili  x   /  AST  25  /  ALT  22  /  AlkPhos  91  06-13    Coags:  PT/INR - ( 12 Jun 2018 02:56 )   PT: 17.0 sec;   INR: 1.55 ratio    PTT - ( 12 Jun 2018 02:56 )  PTT:26.0 sec    Culture - Sputum (collected 13 Jun 2018 10:35)  Source: .Sputum Sputumtrap received  Gram Stain (13 Jun 2018 14:49):    Few polymorphonuclear leukocytes per low power field    Rare Squamous epithelial cells per low power field    Moderate Gram positive cocci in pairs, chains and clusters    Rare Yeast like cells    Few Gram Negative Rods    per oil power field    ## Imaging:  < from: Xray Chest 1 View- PORTABLE-Urgent (06.13.18 @ 07:13) >  Questionable infiltrates. Follow-up study is recommended as clinically warranted.  < end of copied text >    ## Medications:  pantoprazole  Injectable 40 milliGRAM(s) IV Push every 12 hours    ## O/E:  T(C): 37 (13 Jun 2018 11:52), Max: 38.1 (13 Jun 2018 05:00)  HR: 101 (13 Jun 2018 15:00) (91 - 113)  BP: 133/60 (13 Jun 2018 15:00) (109/50 - 173/77)  BP(mean): 74 (13 Jun 2018 15:00) (63 - 96)  RR: 17 (13 Jun 2018 15:00) (16 - 30)  SpO2: 99% (13 Jun 2018 12:00) (96% - 100%)  IN: 568 mL / OUT: 500 mL / NET: 68 mL    Gen: lying comfortably in bed in no apparent distress  HEENT: PERRL  Resp: CTA B/L no c/r/w  CVS: S1S2 no m/r/g  Abd: soft NT/ND +BS  Ext: no c/c/e  Neuro: minimally responsive, grimaces to painful stimuli    ## Daily Issues  - Analgesia: N/A  - Sedation: N/A  - HOB elevation: Y  - GI ppx (ulcers): PPI  - DVT ppx: SCDs  - Nutrition: NPO    ## Assessment / Plan:  89M with BRBPR  - Likely diverticular in origin  - Appears to have stopped at present, with no melena or hematochezia on last BM  - Hgb relatively stable, as it went from 5.7 to 7.8 after 2U pRBCs, which is an appropriate response.  - Platelets are also slightly low but likely due to recent episode of active bleeding.  - Mental status still unclear; protecting airway but not much else in terms of mentation. Will place NGT for feeding.  - Given hemodynamically stable with stable hematocrit and no signs of ongoing bleeding, will transfer to general medicine.    ## Code status:  Goals of care discussion: [x] yes [ ] no  [ ] full code  [ x ] DNR  [ x ] DNI  [ ] MOLST

## 2018-06-13 NOTE — CHART NOTE - NSCHARTNOTEFT_GEN_A_CORE
89M NHR PMH MM a/w pathological fractures p/w BRBPR / melena. In ED, hypotensive. IO placed and brought to ICU for acute GIB likely lower.  GI consulted, recommend further stabilization, likely diverticular source. Hgb 5.7, given 2U pRBCs with good response (5.7 to 8.7). Pt presently stable from ICU standpoint for transfer to medical floor. Pt signed out to Dr. Romo.

## 2018-06-13 NOTE — PHYSICAL THERAPY INITIAL EVALUATION ADULT - ADDITIONAL COMMENTS
Pt with recent admission to [facility] from 4/17-4/20 secondary to pelvic fx & 4/27-5/1 secondary to dehydration. Both admissions pt was discharged to [facility]. Pt has history of MM with known L proximal humerus fx, lytic lesions in L tibia/fibula, & bony mets to pelvis. Pt s/p prophylactic R femur IMN at [facility] in January of 2018. Prior to admission in April of 2018 pt was living alone in a private home, 7 entry steps (+ rail), 1 level inside. Pt had home health aide 4 hours/day, 6 days/week & family provided assist when aide not present in the evenings otherwise pt was alone. Pt is non-ambulatory due to MM & aide dependently transfers pt from bed to wheelchair at home. Since admission to [facility] pt has been minimally verbal & total assist for all mobility/ADL's (lori lift for OOB to chair). Pt receiving maintenance PT/OT services at facility.

## 2018-06-13 NOTE — PROGRESS NOTE ADULT - SUBJECTIVE AND OBJECTIVE BOX
Pt stable from GI standpoint - no further BRBPR      MEDICATIONS  (STANDING):  pantoprazole Infusion 8 mG/Hr (10 mL/Hr) IV Continuous <Continuous>    MEDICATIONS  (PRN):      Allergies    No Known Allergies    Intolerances        Vital Signs Last 24 Hrs  T(C): 37.4 (13 Jun 2018 08:00), Max: 38.1 (13 Jun 2018 05:00)  T(F): 99.3 (13 Jun 2018 08:00), Max: 100.6 (13 Jun 2018 05:00)  HR: 100 (13 Jun 2018 10:00) (86 - 113)  BP: 109/50 (13 Jun 2018 10:00) (109/50 - 173/77)  BP(mean): 63 (13 Jun 2018 10:00) (63 - 110)  RR: 16 (13 Jun 2018 10:00) (16 - 30)  SpO2: 96% (13 Jun 2018 09:50) (96% - 100%)    PHYSICAL EXAM:  General: NAD.  CVS: S1, S2  Chest: air entry bilaterally present  Abd: BS present, soft, non-tender      LABS:                        7.8    13.42 )-----------( 149      ( 13 Jun 2018 04:15 )             22.7     06-13    141  |  110<H>  |  49<H>  ----------------------------<  96  4.4   |  24  |  2.09<H>    Ca    10.2<H>      13 Jun 2018 04:15  Phos  3.2     06-13  Mg     2.2     06-13    TPro  10.3<H>  /  Alb  1.1<L>  /  TBili  0.5  /  DBili  x   /  AST  25  /  ALT  22  /  AlkPhos  91  06-13    PT/INR - ( 12 Jun 2018 02:56 )   PT: 17.0 sec;   INR: 1.55 ratio         PTT - ( 12 Jun 2018 02:56 )  PTT:26.0 sec    Pt s/p 2u prbc's   follow CBC - If Hgb drops further will consider further transfusion  on clear liquids

## 2018-06-13 NOTE — GOALS OF CARE CONVERSATION - PERSONAL ADVANCE DIRECTIVE - CONVERSATION DETAILS
Patient is a 89y old  Male from NH, DNR, with pMhx of HTN, multiple myeloma w mets to bone s/p chemo & radiation, right femur IMN for prophylactic fixation ([facility] January 2018), brought in to ED from for rectal bleed. Found with H/H of 5.7/18.2 and hypotensive, not responsive after 2L of IVF. BP remain in the 80's. Pt receiving 1 unit of RBC in ED, and has an IO to right humerus.      6/13/18 called Emeka Arzolat (daughter) and discussed goals of care and advance care planning. Patient is DNR/ DNI and comfort measures only. Since patient is unable to make his needs known, daughter /HCP stated to start NG Tube feeding for now and will discuss comfort care in few days.   RECOMMENDATIONS:    DNR/ DNI, COMFORT CARE.                                       CONSIDER PAIN AND SYMPTOM MANAGEMENT WITH HOSPICE CARE.

## 2018-06-14 DIAGNOSIS — G93.41 METABOLIC ENCEPHALOPATHY: ICD-10-CM

## 2018-06-14 DIAGNOSIS — C90.00 MULTIPLE MYELOMA NOT HAVING ACHIEVED REMISSION: ICD-10-CM

## 2018-06-14 DIAGNOSIS — R78.81 BACTEREMIA: ICD-10-CM

## 2018-06-14 DIAGNOSIS — K62.5 HEMORRHAGE OF ANUS AND RECTUM: ICD-10-CM

## 2018-06-14 DIAGNOSIS — N17.9 ACUTE KIDNEY FAILURE, UNSPECIFIED: ICD-10-CM

## 2018-06-14 DIAGNOSIS — J15.9 UNSPECIFIED BACTERIAL PNEUMONIA: ICD-10-CM

## 2018-06-14 LAB
ALBUMIN SERPL ELPH-MCNC: 1 G/DL — LOW (ref 3.3–5)
ALP SERPL-CCNC: 99 U/L — SIGNIFICANT CHANGE UP (ref 40–120)
ALT FLD-CCNC: 21 U/L — SIGNIFICANT CHANGE UP (ref 12–78)
ANION GAP SERPL CALC-SCNC: 8 MMOL/L — SIGNIFICANT CHANGE UP (ref 5–17)
AST SERPL-CCNC: 30 U/L — SIGNIFICANT CHANGE UP (ref 15–37)
BASOPHILS # BLD AUTO: 0.05 K/UL — SIGNIFICANT CHANGE UP (ref 0–0.2)
BASOPHILS NFR BLD AUTO: 0.4 % — SIGNIFICANT CHANGE UP (ref 0–2)
BILIRUB SERPL-MCNC: 0.5 MG/DL — SIGNIFICANT CHANGE UP (ref 0.2–1.2)
BUN SERPL-MCNC: 53 MG/DL — HIGH (ref 7–23)
CALCIUM SERPL-MCNC: 10.1 MG/DL — SIGNIFICANT CHANGE UP (ref 8.5–10.1)
CHLORIDE SERPL-SCNC: 112 MMOL/L — HIGH (ref 96–108)
CO2 SERPL-SCNC: 22 MMOL/L — SIGNIFICANT CHANGE UP (ref 22–31)
CREAT SERPL-MCNC: 2.3 MG/DL — HIGH (ref 0.5–1.3)
CULTURE RESULTS: NO GROWTH — SIGNIFICANT CHANGE UP
E COLI DNA BLD POS QL NAA+NON-PROBE: SIGNIFICANT CHANGE UP
EOSINOPHIL # BLD AUTO: 0.01 K/UL — SIGNIFICANT CHANGE UP (ref 0–0.5)
EOSINOPHIL NFR BLD AUTO: 0.1 % — SIGNIFICANT CHANGE UP (ref 0–6)
GLUCOSE SERPL-MCNC: 176 MG/DL — HIGH (ref 70–99)
GRAM STN FLD: SIGNIFICANT CHANGE UP
GRAM STN FLD: SIGNIFICANT CHANGE UP
HCT VFR BLD CALC: 21.9 % — LOW (ref 39–50)
HGB BLD-MCNC: 7.3 G/DL — LOW (ref 13–17)
IMM GRANULOCYTES NFR BLD AUTO: 10.8 % — HIGH (ref 0–1.5)
LYMPHOCYTES # BLD AUTO: 0.47 K/UL — LOW (ref 1–3.3)
LYMPHOCYTES # BLD AUTO: 3.6 % — LOW (ref 13–44)
MAGNESIUM SERPL-MCNC: 2.2 MG/DL — SIGNIFICANT CHANGE UP (ref 1.6–2.6)
MCHC RBC-ENTMCNC: 28.5 PG — SIGNIFICANT CHANGE UP (ref 27–34)
MCHC RBC-ENTMCNC: 33.3 GM/DL — SIGNIFICANT CHANGE UP (ref 32–36)
MCV RBC AUTO: 85.5 FL — SIGNIFICANT CHANGE UP (ref 80–100)
METHOD TYPE: SIGNIFICANT CHANGE UP
METHOD TYPE: SIGNIFICANT CHANGE UP
MONOCYTES # BLD AUTO: 4.92 K/UL — HIGH (ref 0–0.9)
MONOCYTES NFR BLD AUTO: 37.8 % — HIGH (ref 2–14)
NEUTROPHILS # BLD AUTO: 6.16 K/UL — SIGNIFICANT CHANGE UP (ref 1.8–7.4)
NEUTROPHILS NFR BLD AUTO: 47.3 % — SIGNIFICANT CHANGE UP (ref 43–77)
NRBC # BLD: 2 /100 WBCS — HIGH (ref 0–0)
P AERUGINOSA DNA BLD POS NAA+NON-PROBE: SIGNIFICANT CHANGE UP
P AERUGINOSA DNA BLD POS NAA+NON-PROBE: SIGNIFICANT CHANGE UP
PHOSPHATE SERPL-MCNC: 3.5 MG/DL — SIGNIFICANT CHANGE UP (ref 2.5–4.5)
PLATELET # BLD AUTO: 121 K/UL — LOW (ref 150–400)
POTASSIUM SERPL-MCNC: 4.2 MMOL/L — SIGNIFICANT CHANGE UP (ref 3.5–5.3)
POTASSIUM SERPL-SCNC: 4.2 MMOL/L — SIGNIFICANT CHANGE UP (ref 3.5–5.3)
PROT SERPL-MCNC: 10.2 GM/DL — HIGH (ref 6–8.3)
RBC # BLD: 2.56 M/UL — LOW (ref 4.2–5.8)
RBC # FLD: 18.6 % — HIGH (ref 10.3–14.5)
SODIUM SERPL-SCNC: 142 MMOL/L — SIGNIFICANT CHANGE UP (ref 135–145)
SPECIMEN SOURCE: SIGNIFICANT CHANGE UP
WBC # BLD: 13.01 K/UL — HIGH (ref 3.8–10.5)
WBC # FLD AUTO: 13.01 K/UL — HIGH (ref 3.8–10.5)

## 2018-06-14 PROCEDURE — 99233 SBSQ HOSP IP/OBS HIGH 50: CPT

## 2018-06-14 PROCEDURE — 71045 X-RAY EXAM CHEST 1 VIEW: CPT | Mod: 26

## 2018-06-14 RX ORDER — PIPERACILLIN AND TAZOBACTAM 4; .5 G/20ML; G/20ML
3.38 INJECTION, POWDER, LYOPHILIZED, FOR SOLUTION INTRAVENOUS EVERY 8 HOURS
Qty: 0 | Refills: 0 | Status: DISCONTINUED | OUTPATIENT
Start: 2018-06-14 | End: 2018-06-14

## 2018-06-14 RX ORDER — VANCOMYCIN HCL 1 G
1000 VIAL (EA) INTRAVENOUS ONCE
Qty: 0 | Refills: 0 | Status: COMPLETED | OUTPATIENT
Start: 2018-06-14 | End: 2018-06-14

## 2018-06-14 RX ORDER — COLLAGENASE CLOSTRIDIUM HIST. 250 UNIT/G
1 OINTMENT (GRAM) TOPICAL DAILY
Qty: 0 | Refills: 0 | Status: DISCONTINUED | OUTPATIENT
Start: 2018-06-14 | End: 2018-06-16

## 2018-06-14 RX ORDER — CEFEPIME 1 G/1
2000 INJECTION, POWDER, FOR SOLUTION INTRAMUSCULAR; INTRAVENOUS DAILY
Qty: 0 | Refills: 0 | Status: DISCONTINUED | OUTPATIENT
Start: 2018-06-14 | End: 2018-06-16

## 2018-06-14 RX ADMIN — CEFEPIME 100 MILLIGRAM(S): 1 INJECTION, POWDER, FOR SOLUTION INTRAMUSCULAR; INTRAVENOUS at 16:48

## 2018-06-14 RX ADMIN — Medication 650 MILLIGRAM(S): at 08:32

## 2018-06-14 RX ADMIN — PIPERACILLIN AND TAZOBACTAM 25 GRAM(S): 4; .5 INJECTION, POWDER, LYOPHILIZED, FOR SOLUTION INTRAVENOUS at 14:15

## 2018-06-14 RX ADMIN — Medication 250 MILLIGRAM(S): at 16:49

## 2018-06-14 RX ADMIN — Medication 650 MILLIGRAM(S): at 15:44

## 2018-06-14 RX ADMIN — Medication 1 APPLICATION(S): at 05:33

## 2018-06-14 RX ADMIN — PANTOPRAZOLE SODIUM 40 MILLIGRAM(S): 20 TABLET, DELAYED RELEASE ORAL at 18:00

## 2018-06-14 RX ADMIN — PANTOPRAZOLE SODIUM 40 MILLIGRAM(S): 20 TABLET, DELAYED RELEASE ORAL at 05:33

## 2018-06-14 RX ADMIN — PIPERACILLIN AND TAZOBACTAM 25 GRAM(S): 4; .5 INJECTION, POWDER, LYOPHILIZED, FOR SOLUTION INTRAVENOUS at 05:33

## 2018-06-14 RX ADMIN — Medication 650 MILLIGRAM(S): at 23:41

## 2018-06-14 NOTE — DIETITIAN INITIAL EVALUATION ADULT. - NS AS NUTRI INTERV ENTERAL NUTRITION
Recommend: Vital AF to goal rate 55ml/hr = 1320 ml, 1584 kcal & 99 g pro/Composition/Concentration/Rate/Volume

## 2018-06-14 NOTE — DIETITIAN INITIAL EVALUATION ADULT. - PHYSICAL APPEARANCE
BMI = 22.6, Edema - 6/14 -1 + generalized, Nutrition focused physical exam conducted ;   Subcutaneous fat loss: [mild ] Orbital fat pads region, [edentulous ]Buccal fat region, [L arm cast ]Triceps region,  [severe ]Ribs region.  Muscle wasting: [severe ]Temples region, [1+ generalized ]Clavicle region, [1+ generalized ]Shoulder region, [unable ]Scapula region, [severe ]Interosseous region,  [1+ generalized ]thigh region, [1+ generalized ]Calf region         (R) Leg cuff

## 2018-06-14 NOTE — PROVIDER CONTACT NOTE (CRITICAL VALUE NOTIFICATION) - TEST AND RESULT REPORTED:
blood culture collected 13th with preliminary result with growth in anerobic bottle gram negative rods.

## 2018-06-14 NOTE — DIETITIAN INITIAL EVALUATION ADULT. - OTHER INFO
Pt seen today due to RN referral for Pressure ulcer stage ll or Greater. Pt resided in a NH pTA. W/ multiple myeloma, bone mets, s/p chemo and radiation, DNR/DNI Molst form in chart, comfort measures only.. Tolerating Vital HN @ 40 ml/hr = 960 ml, 1152 kcal & 72 g pro.

## 2018-06-14 NOTE — PROGRESS NOTE ADULT - SUBJECTIVE AND OBJECTIVE BOX
Patient is a 89y old  Male who presents with a chief complaint of Bright Red from Rectum (2018 10:00)      INTERVAL HPI/OVERNIGHT EVENTS:  pt. downgraded from ICU. continues to be minimally responsive, CT head neg for acute stroke, +lytic lesions consistent with MM. Pt. is DNR, still with NGT feeds, although family does not want PEG feeds, palliative care involved, will discuss discontinuing NGT feeds.  no further bleeding  +fevers    MEDICATIONS  (STANDING):  collagenase Ointment 1 Application(s) Topical daily  pantoprazole  Injectable 40 milliGRAM(s) IV Push every 12 hours  piperacillin/tazobactam IVPB. 3.375 Gram(s) IV Intermittent every 8 hours    MEDICATIONS  (PRN):  acetaminophen   Tablet 650 milliGRAM(s) Oral every 6 hours PRN For Temp greater than 38 C (100.4 F)      Allergies    No Known Allergies    Intolerances        REVIEW OF SYSTEMS:  unable to assess due to acute on chronic metabolic encephalopathy    Vital Signs Last 24 Hrs  T(C): 38 (2018 11:45), Max: 38.5 (2018 20:00)  T(F): 100.4 (2018 11:45), Max: 101.3 (2018 20:00)  HR: 101 (2018 12:00) (101 - 114)  BP: 112/55 (2018 12:00) (80/50 - 146/63)  BP(mean): 66 (2018 12:00) (55 - 84)  RR: 26 (2018 12:00) (17 - 30)  SpO2: 96% (2018 12:00) (89% - 100%)    PHYSICAL EXAM:  GENERAL: NAD, lying in bed, minimally responsive  HEAD:  Atraumatic, Normocephalic  EYES: EOMI, PERRLA, conjunctiva and sclera clear  NERVOUS SYSTEM:  not opening eyes, some grimacing with sternal rub   CHEST/LUNG: grossly clear anteriorly  HEART: Regular rate and rhythm; No murmurs, rubs, or gallops  ABDOMEN: Soft, Nontender, Nondistended; Bowel sounds present  EXTREMITIES:  2+ Peripheral Pulses, no edema  SKIN: No rashes or lesions    LABS:                        7.3    13.01 )-----------( 121      ( 2018 03:23 )             21.9     06-14    142  |  112<H>  |  53<H>  ----------------------------<  176<H>  4.2   |  22  |  2.30<H>    Ca    10.1      2018 03:23  Phos  3.5       Mg     2.2         TPro  10.2<H>  /  Alb  1.0<L>  /  TBili  0.5  /  DBili  x   /  AST  30  /  ALT  21  /  AlkPhos  99        Urinalysis Basic - ( 2018 09:45 )    Color: Yellow / Appearance: Clear / S.020 / pH: x  Gluc: x / Ketone: Negative  / Bili: Small / Urobili: Negative mg/dL   Blood: x / Protein: 30 mg/dL / Nitrite: Negative   Leuk Esterase: Trace / RBC: 0-2 /HPF / WBC 0-2   Sq Epi: x / Non Sq Epi: Few / Bacteria: Occasional      CAPILLARY BLOOD GLUCOSE          Culture - Blood (collected 2018 12:13)  Source: .Blood Blood  Gram Stain (prelim) (2018 06:14):    Growth in aerobic bottle: Gram Negative Rods    Growth in anaerobic bottle: Gram Variable Rods  Preliminary Report (2018 06:14):    Growth in aerobic bottle: Gram Negative Rods    Growth in anaerobic bottle: Gram Variable Rods    "Due to technical problems, Proteus sp. will Not be reported as part of    the BCID panel until further notice"    ***Blood Panel PCR results on this specimenare available    approximately 3 hours after the Gram stain result.***    Gram stain, PCR, and/or culture results may not always    correspond due to difference in methodologies.    ************************************************************    This PCR assaywas performed using [employer].    The following targets are tested for: Enterococcus,    vancomycin resistant enterococci, Listeria monocytogenes,    coagulase negative staphylococci, S. aureus,    methicillin resistant S. aureus, Streptococcus agalactiae    (Group B), S. pneumoniae, S. pyogenes (Group A),    Acinetobacter baumannii, Enterobacter cloacae, E. coli,    Klebsiella oxytoca, K. pneumoniae, Proteus sp.,    Serratia marcescens, Haemophilus influenzae,    Neisseria meningitidis, Pseudomonas aeruginosa, Candida    albicans, C. glabrata, C krusei, C parapsilosis,    C. tropicalis and the KPC resistance gene.  Organism: Blood Culture PCR (2018 07:42)  Organism: Blood Culture PCR (2018 07:42)    Culture - Blood (collected 2018 12:13)  Source: .Blood Blood  Gram Stain (prelim) (2018 04:13):    Growth in anaerobic bottle: Gram Negative Rods    Growth in aerobic bottle: Gram Negative Rods  Preliminary Report (2018 04:13):    Growth in anaerobic bottle: Gram Negative Rods    "Due to technical problems, Proteus sp. will Not be reported as part of    the BCID panel until further notice"    ***Blood Panel PCR results on this specimen are available    approximately 3 hours after the Gram stain result.***    Gram stain, PCR, and/or culture results may not always    correspond due to difference in methodologies.    ************************************************************    This PCR assay was performed using [employer].    The following targets are tested for: Enterococcus,    vancomycin resistant enterococci, Listeria monocytogenes,    coagulase negative staphylococci, S. aureus,    methicillin resistant S. aureus, Streptococcus agalactiae    (Group B), S. pneumoniae, S. pyogenes (Group A),    Acinetobacter baumannii, Enterobacter cloacae, E. coli,    Klebsiella oxytoca, K. pneumoniae, Proteus sp.,    Serratia marcescens, Haemophilus influenzae,    Neisseria meningitidis, Pseudomonas aeruginosa, Candida    albicans, C. glabrata, C krusei, C parapsilosis,    C. tropicalis and the KPC resistance gene.    Growth in aerobic bottle: Gram Negative Rods  Organism: Blood Culture PCR (2018 02:44)  Organism: Blood Culture PCR (2018 02:44)    Culture - Sputum (collected 2018 10:35)  Source: .Sputum Sputumtrap received  Gram Stain (prelim) (2018 11:55):    Few polymorphonuclear leukocytes per low power field    Rare Squamous epithelial cells per low power field    Moderate Gram positive cocci in pairs, chains and clusters    Rare Yeast like cells    Few Gram Negative Rods    per oil power field  Preliminary Report (2018 11:55):    Moderate Klebsiella pneumoniae    Numerous Staphylococcus aureus    Normal Respiratory Mirtha present      RADIOLOGY & ADDITIONAL TESTS:      Imaging Personally Reviewed:  [x ] YES  [ ] NO    Consultant(s) Notes Reviewed:  [x ] YES  [ ] NO    Care Discussed with Consultants/Other Providers [x ] YES  [ ] NO

## 2018-06-14 NOTE — PROGRESS NOTE ADULT - SUBJECTIVE AND OBJECTIVE BOX
Pt stable - no active bleeding but Hgb is trending down      MEDICATIONS  (STANDING):  collagenase Ointment 1 Application(s) Topical daily  pantoprazole  Injectable 40 milliGRAM(s) IV Push every 12 hours  piperacillin/tazobactam IVPB. 3.375 Gram(s) IV Intermittent every 8 hours    MEDICATIONS  (PRN):  acetaminophen   Tablet 650 milliGRAM(s) Oral every 6 hours PRN For Temp greater than 38 C (100.4 F)      Allergies    No Known Allergies    Intolerances        Vital Signs Last 24 Hrs  T(C): 38 (14 Jun 2018 11:45), Max: 38.5 (13 Jun 2018 20:00)  T(F): 100.4 (14 Jun 2018 11:45), Max: 101.3 (13 Jun 2018 20:00)  HR: 101 (14 Jun 2018 12:00) (101 - 114)  BP: 112/55 (14 Jun 2018 12:00) (80/50 - 146/63)  BP(mean): 66 (14 Jun 2018 12:00) (55 - 84)  RR: 26 (14 Jun 2018 12:00) (17 - 30)  SpO2: 96% (14 Jun 2018 12:00) (89% - 100%)    PHYSICAL EXAM:  General: NAD.  CVS: S1, S2  Chest: air entry bilaterally present  Abd: BS present, soft, non-tender      LABS:                        7.3    13.01 )-----------( 121      ( 14 Jun 2018 03:23 )             21.9     06-14    142  |  112<H>  |  53<H>  ----------------------------<  176<H>  4.2   |  22  |  2.30<H>    Ca    10.1      14 Jun 2018 03:23  Phos  3.5     06-14  Mg     2.2     06-14    TPro  10.2<H>  /  Alb  1.0<L>  /  TBili  0.5  /  DBili  x   /  AST  30  /  ALT  21  /  AlkPhos  99  06-14    follow CBC  might need further transfusion  will discuss with Dr Hummel

## 2018-06-14 NOTE — CONSULT NOTE ADULT - SUBJECTIVE AND OBJECTIVE BOX
Infectious Diseases - Attending at Dr. Harris    HPI:  Patient is a 89y old  Male from NH, DNR, with pMhx of HTN, multiple myeloma w mets to bone s/p chemo & radiation, right femur IMN for prophylactic fixation ([facility] 2018), brought in to ED from for rectal bleed.  Found with H/H of 5.7/18.2 and hypotensive, not responsive after 2L of IVF. BP remain in the 80's  Pt receiving 1 unit of RBC in ED, and has an IO to right humerus. (2018 07:03)      PAST MEDICAL & SURGICAL HISTORY:  Multiple myeloma, failed remission  Essential hypertension  Melanoma  No significant past surgical history      Allergies    No Known Allergies    Intolerances        FAMILY HISTORY:  No pertinent family history in first degree relatives      SOCIAL HISTORY:    REVIEW OF SYSTEMS:    Constitutional: No fever, weight loss or fatigue  Eyes: No eye pain, visual disturbances, or discharge  ENT:  No difficulty hearing, tinnitus, vertigo; No sinus or throat pain  Neck: No pain or stiffness  Respiratory: No cough, wheezing, chills or hemoptysis  Cardiovascular: No chest pain, palpitations, shortness of breath, dizziness or leg swelling  Gastrointestinal: No abdominal or epigastric pain. No nausea, vomiting or hematemesis; No diarrhea or constipation. No melena or hematochezia.  Genitourinary: No dysuria, frequency, hematuria or incontinence  Neurological: No headaches, memory loss, loss of strength, numbness or tremors  Skin: No itching, burning, rashes or lesions       MEDICATIONS  (STANDING):  collagenase Ointment 1 Application(s) Topical daily  pantoprazole  Injectable 40 milliGRAM(s) IV Push every 12 hours  piperacillin/tazobactam IVPB. 3.375 Gram(s) IV Intermittent every 8 hours    MEDICATIONS  (PRN):  acetaminophen   Tablet 650 milliGRAM(s) Oral every 6 hours PRN For Temp greater than 38 C (100.4 F)      Vital Signs Last 24 Hrs  T(C): 38.4 (2018 15:41), Max: 38.5 (2018 20:00)  T(F): 101.1 (2018 15:41), Max: 101.3 (2018 20:00)  HR: 107 (2018 15:48) (101 - 114)  BP: 152/54 (2018 15:48) (80/50 - 152/54)  BP(mean): 70 (2018 15:48) (55 - 84)  RR: 25 (2018 15:48) (19 - 30)  SpO2: 95% (2018 15:48) (89% - 98%)    PHYSICAL EXAM:    Constitutional: NAD, well-groomed, well-developed  HEENT: PERRLA, EOMI, Normal Hearing, MMM  Neck: No LAD, No JVD  Back: Normal spine flexure, No CVA tenderness  Respiratory: CTAB/L  Cardiovascular: S1 and S2, RRR, no M/G/R  Gastrointestinal: BS+, soft, NT/ND  Extremities: No peripheral edema  Vascular: 2+ peripheral pulses  Neurological: A/O x 3, no focal deficits  Skin: No rashes      LABS:                        7.3    13.01 )-----------( 121      ( 2018 03:23 )             21.9     06-14    142  |  112<H>  |  53<H>  ----------------------------<  176<H>  4.2   |  22  |  2.30<H>    Ca    10.1      2018 03:23  Phos  3.5     06-14  Mg     2.2     06-14    TPro  10.2<H>  /  Alb  1.0<L>  /  TBili  0.5  /  DBili  x   /  AST  30  /  ALT  21  /  AlkPhos  99  06-14      Urinalysis Basic - ( 2018 09:45 )    Color: Yellow / Appearance: Clear / S.020 / pH: x  Gluc: x / Ketone: Negative  / Bili: Small / Urobili: Negative mg/dL   Blood: x / Protein: 30 mg/dL / Nitrite: Negative   Leuk Esterase: Trace / RBC: 0-2 /HPF / WBC 0-2   Sq Epi: x / Non Sq Epi: Few / Bacteria: Occasional        MICROBIOLOGY:  RECENT CULTURES:   .Urine Clean Catch (Midstream) XXXX XXXX   No growth     .Blood Blood Blood Culture PCR   Growth in anaerobic bottle: Gram Negative Rods  Growth in aerobic bottle: Gram Negative Rods   Growth in anaerobic bottle: Gram Negative Rods  "Due to technical problems, Proteus sp. will Not be reported as part of  the BCID panel until further notice"  ***Blood Panel PCR results on this specimen are available  approximately 3 hours after the Gram stain result.***  Gram stain, PCR, and/or culture results may not always  correspond due to difference in methodologies.  ************************************************************  This PCR assay was performed using [email or web address].  The following targets are tested for: Enterococcus,  vancomycin resistant enterococci, Listeria monocytogenes,  coagulase negative staphylococci, S. aureus,  methicillin resistant S. aureus, Streptococcus agalactiae  (Group B), S. pneumoniae, S. pyogenes (Group A),  Acinetobacter baumannii, Enterobacter cloacae, E. coli,  Klebsiella oxytoca, K. pneumoniae, Proteus sp.,  Serratia marcescens, Haemophilus influenzae,  Neisseria meningitidis, Pseudomonas aeruginosa, Candida  albicans, C. glabrata, C krusei, C parapsilosis,  C. tropicalis and the KPC resistance gene.  Growth in aerobic bottle: Gram Negative Rods    06-13 .Sputum Sputumtrap received XXXX   Few polymorphonuclear leukocytes per low power field  Rare Squamous epithelial cells per low power field  Moderate Gram positive cocci in pairs, chains and clusters  Rare Yeast like cells  Few Gram Negative Rods  per oil power field   Moderate Klebsiella pneumoniae  Numerous Staphylococcus aureus  Normal Respiratory Mirtha present          RADIOLOGY & ADDITIONAL STUDIES:

## 2018-06-14 NOTE — DIETITIAN INITIAL EVALUATION ADULT. - PERTINENT LABORATORY DATA
06-14 Na142 mmol/L Glu 176 mg/dL<H> K+ 4.2 mmol/L Cr  2.30 mg/dL<H> BUN 53 mg/dL<H> 06-14 Phos 3.5 mg/dL 06-14 Alb 1.0 g/dL<L>06-14 ALT 21 U/L AST 30 U/L Alkaline Phosphatase 99 U/L

## 2018-06-14 NOTE — CHART NOTE - NSCHARTNOTEFT_GEN_A_CORE
Upon Nutritional Assessment by the Registered Dietitian your patient was determined to meet criteria / has evidence of the following diagnosis/diagnoses:          [ ]  Mild Protein Calorie Malnutrition        [ ]  Moderate Protein Calorie Malnutrition        [x ] Severe Protein Calorie Malnutrition        [ ] Unspecified Protein Calorie Malnutrition        [ ] Underweight / BMI <19        [ ] Morbid Obesity / BMI > 40      Findings as based on:  •  Comprehensive nutrition assessment and consultation  •  Calorie counts (nutrient intake analysis)  •  Food acceptance and intake status from observations by staff  •  Follow up  •  Patient education  •  Intervention secondary to interdisciplinary rounds  •   concerns      Treatment:    The following diet has been recommended: Continue current diet Rx as Rx'd      PROVIDER Section:     By signing this assessment you are acknowledging and agree with the diagnosis/diagnoses assigned by the Registered Dietitian    Comments: Upon Nutritional Assessment by the Registered Dietitian your patient was determined to meet criteria / has evidence of the following diagnosis/diagnoses:          [ ]  Mild Protein Calorie Malnutrition        [x ]  Moderate Protein Calorie Malnutrition        [ ] Severe Protein Calorie Malnutrition        [ ] Unspecified Protein Calorie Malnutrition        [ ] Underweight / BMI <19        [ ] Morbid Obesity / BMI > 40      Findings as based on:  •  Comprehensive nutrition assessment and consultation  •  Calorie counts (nutrient intake analysis)  •  Food acceptance and intake status from observations by staff  •  Follow up  •  Patient education  •  Intervention secondary to interdisciplinary rounds  •   concerns      Treatment:    The following diet has been recommended: Continue current diet Rx as Rx'd      PROVIDER Section:     By signing this assessment you are acknowledging and agree with the diagnosis/diagnoses assigned by the Registered Dietitian    Comments:

## 2018-06-14 NOTE — CONSULT NOTE ADULT - ATTENDING COMMENTS
gram neg polymicrobial BSI   cont zosyn  sputum c/s staph aureus   add vanco gram neg polymicrobial BSI   change zosyn to cefepime (renally adjusted)  sputum c/s staph aureus   add vanco

## 2018-06-15 DIAGNOSIS — R53.2 FUNCTIONAL QUADRIPLEGIA: ICD-10-CM

## 2018-06-15 LAB
-  AMIKACIN: SIGNIFICANT CHANGE UP
-  AMOXICILLIN/CLAVULANIC ACID: SIGNIFICANT CHANGE UP
-  AMPICILLIN/SULBACTAM: SIGNIFICANT CHANGE UP
-  AMPICILLIN/SULBACTAM: SIGNIFICANT CHANGE UP
-  AMPICILLIN: SIGNIFICANT CHANGE UP
-  AMPICILLIN: SIGNIFICANT CHANGE UP
-  AZTREONAM: SIGNIFICANT CHANGE UP
-  CEFAZOLIN: SIGNIFICANT CHANGE UP
-  CEFAZOLIN: SIGNIFICANT CHANGE UP
-  CEFEPIME: SIGNIFICANT CHANGE UP
-  CEFOXITIN: SIGNIFICANT CHANGE UP
-  CEFOXITIN: SIGNIFICANT CHANGE UP
-  CEFTAZIDIME: SIGNIFICANT CHANGE UP
-  CEFTRIAXONE: SIGNIFICANT CHANGE UP
-  CEFTRIAXONE: SIGNIFICANT CHANGE UP
-  CIPROFLOXACIN: SIGNIFICANT CHANGE UP
-  ERTAPENEM: SIGNIFICANT CHANGE UP
-  ERTAPENEM: SIGNIFICANT CHANGE UP
-  GENTAMICIN: SIGNIFICANT CHANGE UP
-  IMIPENEM: SIGNIFICANT CHANGE UP
-  LEVOFLOXACIN: SIGNIFICANT CHANGE UP
-  MEROPENEM: SIGNIFICANT CHANGE UP
-  PIPERACILLIN/TAZOBACTAM: SIGNIFICANT CHANGE UP
-  TOBRAMYCIN: SIGNIFICANT CHANGE UP
-  TRIMETHOPRIM/SULFAMETHOXAZOLE: SIGNIFICANT CHANGE UP
-  TRIMETHOPRIM/SULFAMETHOXAZOLE: SIGNIFICANT CHANGE UP
ANION GAP SERPL CALC-SCNC: 10 MMOL/L — SIGNIFICANT CHANGE UP (ref 5–17)
BUN SERPL-MCNC: 77 MG/DL — HIGH (ref 7–23)
CALCIUM SERPL-MCNC: 10.1 MG/DL — SIGNIFICANT CHANGE UP (ref 8.5–10.1)
CHLORIDE SERPL-SCNC: 114 MMOL/L — HIGH (ref 96–108)
CO2 SERPL-SCNC: 21 MMOL/L — LOW (ref 22–31)
CREAT SERPL-MCNC: 4.01 MG/DL — HIGH (ref 0.5–1.3)
CULTURE RESULTS: SIGNIFICANT CHANGE UP
CULTURE RESULTS: SIGNIFICANT CHANGE UP
GLUCOSE SERPL-MCNC: 207 MG/DL — HIGH (ref 70–99)
GRAM STN FLD: SIGNIFICANT CHANGE UP
GRAM STN FLD: SIGNIFICANT CHANGE UP
HCT VFR BLD CALC: 21.4 % — LOW (ref 39–50)
HGB BLD-MCNC: 7.1 G/DL — LOW (ref 13–17)
MCHC RBC-ENTMCNC: 28.7 PG — SIGNIFICANT CHANGE UP (ref 27–34)
MCHC RBC-ENTMCNC: 33.2 GM/DL — SIGNIFICANT CHANGE UP (ref 32–36)
MCV RBC AUTO: 86.6 FL — SIGNIFICANT CHANGE UP (ref 80–100)
METHOD TYPE: SIGNIFICANT CHANGE UP
NRBC # BLD: 0 /100 WBCS — SIGNIFICANT CHANGE UP (ref 0–0)
ORGANISM # SPEC MICROSCOPIC CNT: SIGNIFICANT CHANGE UP
PLATELET # BLD AUTO: 87 K/UL — LOW (ref 150–400)
POTASSIUM SERPL-MCNC: 4.6 MMOL/L — SIGNIFICANT CHANGE UP (ref 3.5–5.3)
POTASSIUM SERPL-SCNC: 4.6 MMOL/L — SIGNIFICANT CHANGE UP (ref 3.5–5.3)
RBC # BLD: 2.47 M/UL — LOW (ref 4.2–5.8)
RBC # FLD: 19.2 % — HIGH (ref 10.3–14.5)
SODIUM SERPL-SCNC: 145 MMOL/L — SIGNIFICANT CHANGE UP (ref 135–145)
SPECIMEN SOURCE: SIGNIFICANT CHANGE UP
SPECIMEN SOURCE: SIGNIFICANT CHANGE UP
WBC # BLD: 11.4 K/UL — HIGH (ref 3.8–10.5)
WBC # FLD AUTO: 11.4 K/UL — HIGH (ref 3.8–10.5)

## 2018-06-15 PROCEDURE — 99497 ADVNCD CARE PLAN 30 MIN: CPT

## 2018-06-15 PROCEDURE — 99233 SBSQ HOSP IP/OBS HIGH 50: CPT

## 2018-06-15 RX ORDER — MORPHINE SULFATE 50 MG/1
0.5 CAPSULE, EXTENDED RELEASE ORAL THREE TIMES A DAY
Qty: 0 | Refills: 0 | Status: DISCONTINUED | OUTPATIENT
Start: 2018-06-15 | End: 2018-06-16

## 2018-06-15 RX ADMIN — MORPHINE SULFATE 0.5 MILLIGRAM(S): 50 CAPSULE, EXTENDED RELEASE ORAL at 22:30

## 2018-06-15 RX ADMIN — PANTOPRAZOLE SODIUM 40 MILLIGRAM(S): 20 TABLET, DELAYED RELEASE ORAL at 17:35

## 2018-06-15 RX ADMIN — CEFEPIME 100 MILLIGRAM(S): 1 INJECTION, POWDER, FOR SOLUTION INTRAMUSCULAR; INTRAVENOUS at 12:25

## 2018-06-15 RX ADMIN — Medication 1 APPLICATION(S): at 12:25

## 2018-06-15 RX ADMIN — MORPHINE SULFATE 0.5 MILLIGRAM(S): 50 CAPSULE, EXTENDED RELEASE ORAL at 22:14

## 2018-06-15 RX ADMIN — MORPHINE SULFATE 0.5 MILLIGRAM(S): 50 CAPSULE, EXTENDED RELEASE ORAL at 14:09

## 2018-06-15 RX ADMIN — MORPHINE SULFATE 0.5 MILLIGRAM(S): 50 CAPSULE, EXTENDED RELEASE ORAL at 14:15

## 2018-06-15 RX ADMIN — PANTOPRAZOLE SODIUM 40 MILLIGRAM(S): 20 TABLET, DELAYED RELEASE ORAL at 05:55

## 2018-06-15 NOTE — CHART NOTE - NSCHARTNOTEFT_GEN_A_CORE
Discussed patients current condition and goals of care with daughter  Informed her that pt. is not tolerating NGT feeds and talked about focusing on comfort  She will discuss PEG tube with siblings but is leaning away from that option. Agreed to hospice consult. Pt. remains DNR and prognosis is poor.  [facility].

## 2018-06-15 NOTE — PROGRESS NOTE ADULT - SUBJECTIVE AND OBJECTIVE BOX
No further bleeding  on tube feedings - pt with high residuals noted      MEDICATIONS  (STANDING):  cefepime   IVPB 2000 milliGRAM(s) IV Intermittent daily  collagenase Ointment 1 Application(s) Topical daily  pantoprazole  Injectable 40 milliGRAM(s) IV Push every 12 hours    MEDICATIONS  (PRN):  acetaminophen   Tablet 650 milliGRAM(s) Oral every 6 hours PRN For Temp greater than 38 C (100.4 F)      Allergies    No Known Allergies    Intolerances        Vital Signs Last 24 Hrs  T(C): 36.9 (15 Jadiel 2018 04:00), Max: 38.4 (14 Jun 2018 15:41)  T(F): 98.4 (15 Jadiel 2018 04:00), Max: 101.1 (14 Jun 2018 15:41)  HR: 87 (15 Jadiel 2018 05:00) (87 - 111)  BP: 139/57 (15 Jadiel 2018 05:00) (112/55 - 152/54)  BP(mean): 66 (15 Jadiel 2018 00:00) (66 - 85)  RR: 22 (15 Jadiel 2018 05:00) (22 - 26)  SpO2: 89% (15 Jadiel 2018 05:00) (89% - 96%)    PHYSICAL EXAM:  General: NAD.  CVS: S1, S2  Chest: air entry bilaterally present  Abd: BS present, soft, non-tender      LABS:                        7.3    13.01 )-----------( 121      ( 14 Jun 2018 03:23 )             21.9     06-14    142  |  112<H>  |  53<H>  ----------------------------<  176<H>  4.2   |  22  |  2.30<H>    Ca    10.1      14 Jun 2018 03:23  Phos  3.5     06-14  Mg     2.2     06-14    TPro  10.2<H>  /  Alb  1.0<L>  /  TBili  0.5  /  DBili  x   /  AST  30  /  ALT  21  /  AlkPhos  99  06-14        CBC pending  hold tube feedings x 24 hrs and then resume

## 2018-06-15 NOTE — PROGRESS NOTE ADULT - PROBLEM SELECTOR PLAN 5
appreciate ID input, changed to cefepime, vanco added
gram negative bacteremia, cont with zosyn, ID consult

## 2018-06-15 NOTE — PROGRESS NOTE ADULT - ASSESSMENT
Patient is a 89y old  Male from NH, DNR, with pMhx of HTN, multiple myeloma w mets to bone s/p chemo & radiation, right femur IMN for prophylactic fixation ([facility] January 2018), brought in to ED from for rectal bleed.  Found with H/H of 5.7/18.2 and hypotensive, not responsive after 2L of IVF.   No further bleeding, pt. is DNR, family does not want aggresive care, downgraded from ICU
Patient is a 89y old  Male from NH, DNR, with pMhx of HTN, multiple myeloma w mets to bone s/p chemo & radiation, right femur IMN for prophylactic fixation ([facility] January 2018), brought in to ED from for rectal bleed.  Found with H/H of 5.7/18.2 and hypotensive, not responsive after 2L of IVF.   No further bleeding, pt. is DNR, family does not want aggresive care, downgraded from ICU

## 2018-06-15 NOTE — PROGRESS NOTE ADULT - PROBLEM SELECTOR PLAN 6
likely gram negative, cont with cefepime
cont with zosyn, likely aspiration, no PEG tube as per family, will withdraw tube feeds after discussion with family  PT. is DNR  ID consult

## 2018-06-15 NOTE — PROGRESS NOTE ADULT - SUBJECTIVE AND OBJECTIVE BOX
Patient is a 89y old  Male who presents with a chief complaint of Bright Red from Rectum (12 Jun 2018 10:00)      INTERVAL HPI/OVERNIGHT EVENTS:  pt. not tolerating tube feeds (+residual), tube feeds held as per GI for 12 hours, fevers yesterday    MEDICATIONS  (STANDING):  cefepime   IVPB 2000 milliGRAM(s) IV Intermittent daily  collagenase Ointment 1 Application(s) Topical daily  pantoprazole  Injectable 40 milliGRAM(s) IV Push every 12 hours    MEDICATIONS  (PRN):  acetaminophen   Tablet 650 milliGRAM(s) Oral every 6 hours PRN For Temp greater than 38 C (100.4 F)      Allergies    No Known Allergies    Intolerances        REVIEW OF SYSTEMS:  unable to assess given encephalopathy    Vital Signs Last 24 Hrs  T(C): 36.9 (15 Jadiel 2018 04:00), Max: 38.4 (14 Jun 2018 15:41)  T(F): 98.4 (15 Jadiel 2018 04:00), Max: 101.1 (14 Jun 2018 15:41)  HR: 87 (15 Jadiel 2018 05:00) (87 - 111)  BP: 139/57 (15 Jadiel 2018 05:00) (112/55 - 152/54)  BP(mean): 66 (15 Jadiel 2018 00:00) (66 - 85)  RR: 22 (15 Jadiel 2018 05:00) (22 - 26)  SpO2: 89% (15 Jadiel 2018 05:00) (89% - 96%)    PHYSICAL EXAM:  GENERAL: NAD, minimally responsive, grimaces to sternal rub  HEAD:  Atraumatic, Normocephalic  EYES: EOMI, PERRLA, conjunctiva and sclera clear  ENMT: No tonsillar erythema, exudates, or enlargement; Moist mucous membranes  NERVOUS SYSTEM:  minmally responsive, bed bound  CHEST/LUNG: Clear to percussion bilaterally; No rales, rhonchi, wheezing, or rubs  HEART: Regular rate and rhythm; No murmurs, rubs, or gallops  ABDOMEN: Soft, Nontender, Nondistended; Bowel sounds present  EXTREMITIES:  2+ Peripheral Pulses, UE dependent edema  SKIN: No rashes or lesions    LABS:                        7.3    13.01 )-----------( 121      ( 14 Jun 2018 03:23 )             21.9     06-14    142  |  112<H>  |  53<H>  ----------------------------<  176<H>  4.2   |  22  |  2.30<H>    Ca    10.1      14 Jun 2018 03:23  Phos  3.5     06-14  Mg     2.2     06-14    TPro  10.2<H>  /  Alb  1.0<L>  /  TBili  0.5  /  DBili  x   /  AST  30  /  ALT  21  /  AlkPhos  99  06-14        CAPILLARY BLOOD GLUCOSE          Culture - Urine (collected 13 Jun 2018 12:35)  Source: .Urine Clean Catch (Midstream)  Final Report (14 Jun 2018 13:10):    No growth    Culture - Blood (collected 13 Jun 2018 12:13)  Source: .Blood Blood  Gram Stain (prelim) (14 Jun 2018 06:14):    Growth in aerobic bottle: Gram Negative Rods    Growth in anaerobic bottle: Gram Variable Rods  Preliminary Report (14 Jun 2018 06:14):    Growth in aerobic bottle: Gram Negative Rods    Growth in anaerobic bottle: Gram Variable Rods    "Due to technical problems, Proteus sp. will Not be reported as part of    the BCID panel until further notice"    ***Blood Panel PCR results on this specimenare available    approximately 3 hours after the Gram stain result.***    Gram stain, PCR, and/or culture results may not always    correspond due to difference in methodologies.    ************************************************************    This PCR assaywas performed using [employer].    The following targets are tested for: Enterococcus,    vancomycin resistant enterococci, Listeria monocytogenes,    coagulase negative staphylococci, S. aureus,    methicillin resistant S. aureus, Streptococcus agalactiae    (Group B), S. pneumoniae, S. pyogenes (Group A),    Acinetobacter baumannii, Enterobacter cloacae, E. coli,    Klebsiella oxytoca, K. pneumoniae, Proteus sp.,    Serratia marcescens, Haemophilus influenzae,    Neisseria meningitidis, Pseudomonas aeruginosa, Candida    albicans, C. glabrata, C krusei, C parapsilosis,    C. tropicalis and the KPC resistance gene.  Organism: Blood Culture PCR (14 Jun 2018 07:42)  Organism: Blood Culture PCR (14 Jun 2018 07:42)    Culture - Blood (collected 13 Jun 2018 12:13)  Source: .Blood Blood  Gram Stain (prelim) (14 Jun 2018 04:13):    Growth in anaerobic bottle: Gram Negative Rods    Growth in aerobic bottle: Gram Negative Rods  Preliminary Report (14 Jun 2018 18:23):    Growth in anaerobic bottle: Escherichia coli    Growth in aerobic bottle: Pseudomonas aeruginosa    "Due to technical problems, Proteus sp. will Not be reported as part of    the BCID panel until further notice"    ***Blood Panel PCR results on this specimen are available    approximately 3 hours after the Gram stain result.***    Gram stain, PCR, and/or culture results may not always    correspond due to difference in methodologies.    ************************************************************    This PCR assay was performed using [employer].    The following targets are tested for: Enterococcus,    vancomycin resistant enterococci, Listeria monocytogenes,    coagulase negative staphylococci, S. aureus,    methicillin resistant S. aureus, Streptococcus agalactiae    (Group B), S. pneumoniae, S. pyogenes (Group A),    Acinetobacter baumannii, Enterobacter cloacae, E. coli,    Klebsiella oxytoca, K. pneumoniae, Proteus sp.,    Serratia marcescens, Haemophilus influenzae,    Neisseria meningitidis, Pseudomonas aeruginosa, Candida    albicans, C. glabrata, C krusei, C parapsilosis,    C. tropicalis and the KPC resistance gene.  Organism: Blood Culture PCR (14 Jun 2018 02:44)  Organism: Blood Culture PCR (14 Jun 2018 02:44)    Culture - Sputum (collected 13 Jun 2018 10:35)  Source: .Sputum Sputumtrap received  Gram Stain (prelim) (14 Jun 2018 11:55):    Few polymorphonuclear leukocytes per low power field    Rare Squamous epithelial cells per low power field    Moderate Gram positive cocci in pairs, chains and clusters    Rare Yeast like cells    Few Gram Negative Rods    per oil power field  Preliminary Report (15 Jadiel 2018 08:31):    Moderate Klebsiella pneumoniae    Numerous Staphylococcus aureus Susceptibility to follow.    Normal Respiratory Mirtha present  Organism: Klebsiella pneumoniae (15 Jadiel 2018 08:26)  Organism: Klebsiella pneumoniae (15 Jadiel 2018 08:26)      RADIOLOGY & ADDITIONAL TESTS:    Imaging Personally Reviewed:  [x ] YES  [ ] NO    Consultant(s) Notes Reviewed:  [x ] YES  [ ] NO    Care Discussed with Consultants/Other Providers [x ] YES  [ ] NO

## 2018-06-15 NOTE — PROGRESS NOTE ADULT - PROBLEM SELECTOR PLAN 4
advanced, mets, no further treatment  supportive care
advanced, mets, no further treatment  supportive care

## 2018-06-15 NOTE — PROGRESS NOTE ADULT - PROBLEM SELECTOR PLAN 1
no further bleeding, appreciate GI, no intervention further work up at this time, monitor h/h, has been trending down  address GOC with family, palliative regarding transfusion and other aggressive care
no further bleeding, appreciate GI, no intervention further work up at this time, monitor h/h

## 2018-06-15 NOTE — PROGRESS NOTE ADULT - PROBLEM SELECTOR PLAN 2
secondary to multiple medical problems  cont to treat underlying illness  no acute findings on CT head, +lytic lesions  npo with ngt feeds currently given poor mental status, family does not want PEG feeds, will fu with palliative and family regarding possible comfort care, Hospice referral
secondary to multiple medical problems  cont to treat underlying illness  no acute findings on CT head, +lytic lesions

## 2018-06-16 VITALS
WEIGHT: 153.44 LBS | HEART RATE: 76 BPM | TEMPERATURE: 99 F | SYSTOLIC BLOOD PRESSURE: 96 MMHG | DIASTOLIC BLOOD PRESSURE: 75 MMHG | RESPIRATION RATE: 22 BRPM | OXYGEN SATURATION: 90 %

## 2018-06-16 LAB
-  AMPICILLIN/SULBACTAM: SIGNIFICANT CHANGE UP
-  CEFAZOLIN: SIGNIFICANT CHANGE UP
-  CLINDAMYCIN: SIGNIFICANT CHANGE UP
-  ERYTHROMYCIN: SIGNIFICANT CHANGE UP
-  GENTAMICIN: SIGNIFICANT CHANGE UP
-  OXACILLIN: SIGNIFICANT CHANGE UP
-  PENICILLIN: SIGNIFICANT CHANGE UP
-  RIFAMPIN: SIGNIFICANT CHANGE UP
-  TETRACYCLINE: SIGNIFICANT CHANGE UP
-  TRIMETHOPRIM/SULFAMETHOXAZOLE: SIGNIFICANT CHANGE UP
-  VANCOMYCIN: SIGNIFICANT CHANGE UP
ANION GAP SERPL CALC-SCNC: 11 MMOL/L — SIGNIFICANT CHANGE UP (ref 5–17)
BUN SERPL-MCNC: 91 MG/DL — HIGH (ref 7–23)
CALCIUM SERPL-MCNC: 10.1 MG/DL — SIGNIFICANT CHANGE UP (ref 8.5–10.1)
CHLORIDE SERPL-SCNC: 115 MMOL/L — HIGH (ref 96–108)
CO2 SERPL-SCNC: 19 MMOL/L — LOW (ref 22–31)
CREAT SERPL-MCNC: 5.05 MG/DL — HIGH (ref 0.5–1.3)
CULTURE RESULTS: SIGNIFICANT CHANGE UP
GLUCOSE SERPL-MCNC: 94 MG/DL — SIGNIFICANT CHANGE UP (ref 70–99)
GRAM STN FLD: SIGNIFICANT CHANGE UP
HCT VFR BLD CALC: 23.8 % — LOW (ref 39–50)
HGB BLD-MCNC: 7.7 G/DL — LOW (ref 13–17)
MAGNESIUM SERPL-MCNC: 2.5 MG/DL — SIGNIFICANT CHANGE UP (ref 1.6–2.6)
MCHC RBC-ENTMCNC: 28.3 PG — SIGNIFICANT CHANGE UP (ref 27–34)
MCHC RBC-ENTMCNC: 32.4 GM/DL — SIGNIFICANT CHANGE UP (ref 32–36)
MCV RBC AUTO: 87.5 FL — SIGNIFICANT CHANGE UP (ref 80–100)
METHOD TYPE: SIGNIFICANT CHANGE UP
NRBC # BLD: 1 /100 WBCS — HIGH (ref 0–0)
ORGANISM # SPEC MICROSCOPIC CNT: SIGNIFICANT CHANGE UP
PHOSPHATE SERPL-MCNC: 4.2 MG/DL — SIGNIFICANT CHANGE UP (ref 2.5–4.5)
PLATELET # BLD AUTO: 88 K/UL — LOW (ref 150–400)
POTASSIUM SERPL-MCNC: 5.7 MMOL/L — HIGH (ref 3.5–5.3)
POTASSIUM SERPL-SCNC: 5.7 MMOL/L — HIGH (ref 3.5–5.3)
RBC # BLD: 2.72 M/UL — LOW (ref 4.2–5.8)
RBC # FLD: 19.9 % — HIGH (ref 10.3–14.5)
SODIUM SERPL-SCNC: 145 MMOL/L — SIGNIFICANT CHANGE UP (ref 135–145)
SPECIMEN SOURCE: SIGNIFICANT CHANGE UP
WBC # BLD: 10.51 K/UL — HIGH (ref 3.8–10.5)
WBC # FLD AUTO: 10.51 K/UL — HIGH (ref 3.8–10.5)

## 2018-06-16 RX ORDER — SODIUM CHLORIDE 9 MG/ML
1000 INJECTION INTRAMUSCULAR; INTRAVENOUS; SUBCUTANEOUS ONCE
Qty: 0 | Refills: 0 | Status: COMPLETED | OUTPATIENT
Start: 2018-06-16 | End: 2018-06-16

## 2018-06-16 RX ADMIN — Medication 650 MILLIGRAM(S): at 02:32

## 2018-06-16 NOTE — CHART NOTE - NSCHARTNOTEFT_GEN_A_CORE
called to pt bedside by RN for pt being pulseless/apneic. Pt was known DNR/DNI with comfort care. Recent transfer from ICU.  Examined pt and found him to have no pulse/BP/and apneic/ Pt. had no gag/corneal reflex and time of death called at 06:50am  Family was notified.

## 2018-06-16 NOTE — DISCHARGE NOTE FOR THE EXPIRED PATIENT - HOSPITAL COURSE
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  Found with H/H of 5.7/18.2 and hypotensive, not responsive after 2L of IVF. Was admitted and resuscitated in ICU   No further bleeding, pt. is DNR, family does not want aggressive care, downgraded from ICU to floor where he decompensated became hypotensive and bradycardic despite IVF.

## 2018-06-20 DIAGNOSIS — N18.3 CHRONIC KIDNEY DISEASE, STAGE 3 (MODERATE): ICD-10-CM

## 2018-06-20 DIAGNOSIS — J15.6 PNEUMONIA DUE TO OTHER GRAM-NEGATIVE BACTERIA: ICD-10-CM

## 2018-06-20 DIAGNOSIS — Z66 DO NOT RESUSCITATE: ICD-10-CM

## 2018-06-20 DIAGNOSIS — C75.1 MALIGNANT NEOPLASM OF PITUITARY GLAND: ICD-10-CM

## 2018-06-20 DIAGNOSIS — Z85.820 PERSONAL HISTORY OF MALIGNANT MELANOMA OF SKIN: ICD-10-CM

## 2018-06-20 DIAGNOSIS — Z92.21 PERSONAL HISTORY OF ANTINEOPLASTIC CHEMOTHERAPY: ICD-10-CM

## 2018-06-20 DIAGNOSIS — R78.81 BACTEREMIA: ICD-10-CM

## 2018-06-20 DIAGNOSIS — K21.9 GASTRO-ESOPHAGEAL REFLUX DISEASE WITHOUT ESOPHAGITIS: ICD-10-CM

## 2018-06-20 DIAGNOSIS — I10 ESSENTIAL (PRIMARY) HYPERTENSION: ICD-10-CM

## 2018-06-20 DIAGNOSIS — K57.31 DIVERTICULOSIS OF LARGE INTESTINE WITHOUT PERFORATION OR ABSCESS WITH BLEEDING: ICD-10-CM

## 2018-06-20 DIAGNOSIS — Z96.7 PRESENCE OF OTHER BONE AND TENDON IMPLANTS: ICD-10-CM

## 2018-06-20 DIAGNOSIS — C79.51 SECONDARY MALIGNANT NEOPLASM OF BONE: ICD-10-CM

## 2018-06-20 DIAGNOSIS — C90.00 MULTIPLE MYELOMA NOT HAVING ACHIEVED REMISSION: ICD-10-CM

## 2018-06-20 DIAGNOSIS — N17.9 ACUTE KIDNEY FAILURE, UNSPECIFIED: ICD-10-CM

## 2018-06-20 DIAGNOSIS — Z92.3 PERSONAL HISTORY OF IRRADIATION: ICD-10-CM

## 2018-06-20 DIAGNOSIS — K62.5 HEMORRHAGE OF ANUS AND RECTUM: ICD-10-CM

## 2018-06-20 DIAGNOSIS — Z51.5 ENCOUNTER FOR PALLIATIVE CARE: ICD-10-CM

## 2018-06-20 DIAGNOSIS — G93.41 METABOLIC ENCEPHALOPATHY: ICD-10-CM

## 2018-06-20 DIAGNOSIS — B95.61 METHICILLIN SUSCEPTIBLE STAPHYLOCOCCUS AUREUS INFECTION AS THE CAUSE OF DISEASES CLASSIFIED ELSEWHERE: ICD-10-CM

## 2018-06-20 DIAGNOSIS — B96.1 KLEBSIELLA PNEUMONIAE [K. PNEUMONIAE] AS THE CAUSE OF DISEASES CLASSIFIED ELSEWHERE: ICD-10-CM

## 2018-06-20 DIAGNOSIS — I95.9 HYPOTENSION, UNSPECIFIED: ICD-10-CM

## 2018-06-20 DIAGNOSIS — B96.89 OTHER SPECIFIED BACTERIAL AGENTS AS THE CAUSE OF DISEASES CLASSIFIED ELSEWHERE: ICD-10-CM

## 2018-06-20 DIAGNOSIS — D50.0 IRON DEFICIENCY ANEMIA SECONDARY TO BLOOD LOSS (CHRONIC): ICD-10-CM

## 2018-06-20 DIAGNOSIS — J69.0 PNEUMONITIS DUE TO INHALATION OF FOOD AND VOMIT: ICD-10-CM

## 2019-06-03 NOTE — PHYSICAL THERAPY INITIAL EVALUATION ADULT - RISK REDUCTION/PREVENTION, PT EVAL
Echo/nuclear stress/holter-Marcy will call to schedule your testing within 48 hours.      If you do not hear from her, then please call central scheduling at [phone] or [phone] Beatris Sanders)    All testing/lab work is completed at [facility] [place] [facility]     Nitro and Aspirin ordered
risk factors/recurrence of condition/secondary impairments

## 2019-10-02 PROBLEM — Z60.2 PERSON LIVING ALONE: Status: ACTIVE | Noted: 2018-05-25

## 2022-07-21 NOTE — DISCHARGE NOTE ADULT - PHYSICIAN SECTION COMPLETE
Yes Transposition Flap Text: The defect edges were debeveled with a #15 scalpel blade.  Given the location of the defect and the proximity to free margins a transposition flap was deemed most appropriate.  Using a sterile surgical marker, an appropriate transposition flap was drawn incorporating the defect.    The area thus outlined was incised deep to adipose tissue with a #15 scalpel blade.  The skin margins were undermined to an appropriate distance in all directions utilizing iris scissors.

## 2022-10-08 NOTE — PROGRESS NOTE ADULT - SUBJECTIVE AND OBJECTIVE BOX
Patient feeling well	    Vital Signs Last 24 Hrs  T(C): 36.6 (20 Apr 2018 11:37), Max: 36.6 (19 Apr 2018 19:14)  T(F): 97.8 (20 Apr 2018 11:37), Max: 97.9 (19 Apr 2018 19:14)  HR: 78 (20 Apr 2018 11:37) (75 - 82)  BP: 114/76 (20 Apr 2018 11:37) (106/56 - 115/62)  BP(mean): --  RR: 16 (20 Apr 2018 11:37) (16 - 18)  SpO2: 97% (20 Apr 2018 11:37) (96% - 97%)    PHYSICAL EXAM:    general - AAO x 3  HEENT - No Icterus  CVS - RRR  RS - AE B/L  Abd - soft, NT  Ext - Pulses +        LABS:                        8.1    3.58  )-----------( 194      ( 19 Apr 2018 08:15 )             25.4     04-19    136  |  103  |  14  ----------------------------<  93  4.1   |  29  |  1.26    Ca    9.7      19 Apr 2018 08:15              RADIOLOGY & ADDITIONAL STUDIES: Stable

## 2023-01-04 NOTE — ED ADULT NURSE NOTE - NS ED NURSE DC INFO COMPLEXITY
Cardiothoracic surgery should call you to arrange repeat CT imaging and outpatient follow-up in 1 month  If you do not hear from them by the end of next week, please call number provided to arrange  Please call and arrange follow-up with your primary Nephrologist (kidney doctor) ASAP  You have been provided order for repeat lab work to reassess kidney function on Monday 1/9/23  Please also call and arrange follow-up with your primary Cardiologist ASAP 
Patient asked questions/Simple: Patient demonstrates quick and easy understanding/Verbalized Understanding

## 2023-07-27 NOTE — PHYSICAL THERAPY INITIAL EVALUATION ADULT - PATIENT PROFILE REVIEW, REHAB EVAL
yes Render Note In Bullet Format When Appropriate: No Show Applicator Variable?: Yes Duration Of Freeze Thaw-Cycle (Seconds): 5 Consent: The patient's consent was obtained including but not limited to risks of crusting, scabbing, blistering, scarring, darker or lighter pigmentary change, recurrence, incomplete removal and infection. Detail Level: Simple Number Of Freeze-Thaw Cycles: 1 freeze-thaw cycle Post-Care Instructions: I reviewed with the patient in detail post-care instructions. Patient is to wear sunprotection, and avoid picking at any of the treated lesions. Pt may apply Vaseline to crusted or scabbing areas.
